# Patient Record
Sex: FEMALE | Race: BLACK OR AFRICAN AMERICAN | NOT HISPANIC OR LATINO | Employment: OTHER | URBAN - METROPOLITAN AREA
[De-identification: names, ages, dates, MRNs, and addresses within clinical notes are randomized per-mention and may not be internally consistent; named-entity substitution may affect disease eponyms.]

---

## 2017-01-10 DIAGNOSIS — M17.0 PRIMARY OSTEOARTHRITIS OF BOTH KNEES: ICD-10-CM

## 2017-01-10 RX ORDER — HYDROCODONE BITARTRATE AND ACETAMINOPHEN 10; 325 MG/1; MG/1
1 TABLET ORAL 3 TIMES DAILY PRN
Qty: 90 TABLET | Refills: 0 | Status: SHIPPED | OUTPATIENT
Start: 2017-01-12 | End: 2017-01-12 | Stop reason: SDUPTHER

## 2017-01-10 NOTE — TELEPHONE ENCOUNTER
----- Message from Roxane Hester sent at 1/10/2017 12:07 PM CST -----  Contact: Patient  Patient requesting refill of hydrocodone-acetaminophen 10-325mg (NORCO)  mg [facility] STORE [id number] [place] [address] [address] AT [address] & [facility] [address]

## 2017-01-11 DIAGNOSIS — M17.0 PRIMARY OSTEOARTHRITIS OF BOTH KNEES: ICD-10-CM

## 2017-01-11 NOTE — TELEPHONE ENCOUNTER
Rx refilled for 01/10/17. Done      Patient needs a refill on hydrocodone-acetaminophen 10-325mg (NORCO)  mg Tab     [facility] - [place] [address] [facility] & [address] [address]   Phone: [phone] Fax: [phone]

## 2017-01-12 ENCOUNTER — TELEPHONE (OUTPATIENT)
Dept: PHYSICAL MEDICINE AND REHAB | Facility: CLINIC | Age: 62
End: 2017-01-12

## 2017-01-12 DIAGNOSIS — M17.0 PRIMARY OSTEOARTHRITIS OF BOTH KNEES: ICD-10-CM

## 2017-01-12 RX ORDER — HYDROCODONE BITARTRATE AND ACETAMINOPHEN 10; 325 MG/1; MG/1
1 TABLET ORAL 3 TIMES DAILY PRN
Qty: 90 TABLET | Refills: 0 | Status: SHIPPED | OUTPATIENT
Start: 2017-01-12 | End: 2017-02-14 | Stop reason: SDUPTHER

## 2017-01-12 NOTE — TELEPHONE ENCOUNTER
Called and spoke with patient.  She was informed that I called [facility]  They were having trouble with there system so the Rx refill ddi not go threw.  Patient was ok with this.  New Rx for Hydrocodone will be sent.      Rx refill sent to Pharmacy 01/10/17    Pt is requesting to have Combes 10-325mg [facility] [phone] (Phone)   [phone] (Fax)     Pt contact number [phone]   Thanks

## 2017-02-02 ENCOUNTER — TELEPHONE (OUTPATIENT)
Dept: PHYSICAL MEDICINE AND REHAB | Facility: CLINIC | Age: 62
End: 2017-02-02

## 2017-02-02 NOTE — TELEPHONE ENCOUNTER
Called and spoke with patient.  [employer] has more questions concerning the form that was filled out for the patient.      ---- Message from Evon Fan sent at 2017  9:40 AM CST -----  Contact: PT  Would like someone to call her, regarding a form that has to be filled out for her housing. She would like someone to call her regarding this matter.     Please call: [phone]    Please call: Bina De La O ([facility])    Number to [phone]  Fax: [phone]

## 2017-02-03 NOTE — TELEPHONE ENCOUNTER
----- Message from Dex Joseph sent at 2/3/2017  9:18 AM CST -----  Contact: Self [phone]  Patient needs a medication refill     [facility] [employer] Store [address] - [place] [address] [facility] & [place] [address] [facility] [address]  Phone: [phone] Fax: [phone]

## 2017-02-14 DIAGNOSIS — M17.0 PRIMARY OSTEOARTHRITIS OF BOTH KNEES: ICD-10-CM

## 2017-02-14 RX ORDER — HYDROCODONE BITARTRATE AND ACETAMINOPHEN 10; 325 MG/1; MG/1
1 TABLET ORAL 3 TIMES DAILY PRN
Qty: 90 TABLET | Refills: 0 | Status: SHIPPED | OUTPATIENT
Start: 2017-02-14 | End: 2017-03-13 | Stop reason: SDUPTHER

## 2017-02-14 NOTE — TELEPHONE ENCOUNTER
10/31/16 last Office visit  01/12/17 last Rx refill  02/20/17 RTC      Pt is req a refill for hydrocodone 10/325.         [facility] - [place] [address] [facility] & [place] [address] [facility] [address]   Phone: [phone] Fax: [phone]

## 2017-03-13 DIAGNOSIS — M17.0 PRIMARY OSTEOARTHRITIS OF BOTH KNEES: ICD-10-CM

## 2017-03-13 RX ORDER — HYDROCODONE BITARTRATE AND ACETAMINOPHEN 10; 325 MG/1; MG/1
1 TABLET ORAL 3 TIMES DAILY PRN
Qty: 90 TABLET | Refills: 0 | Status: SHIPPED | OUTPATIENT
Start: 2017-03-13 | End: 2017-04-13 | Stop reason: SDUPTHER

## 2017-03-13 NOTE — TELEPHONE ENCOUNTER
10/31/16 last Office visit  02/14/17 last Rx refill  03/27/17 RTC    Patient is requesting a refill on hydrocodone-acetaminophen 10-325mg (NORCO)  mg Tab.       [facility] Store [address] - [place] [address] [address] [facility]   [address] [facility] [address]   Phone: [phone] Fax: [phone]

## 2017-03-27 ENCOUNTER — OFFICE VISIT (OUTPATIENT)
Dept: PHYSICAL MEDICINE AND REHAB | Facility: CLINIC | Age: 62
End: 2017-03-27
Payer: MEDICARE

## 2017-03-27 VITALS
HEIGHT: 67 IN | HEART RATE: 88 BPM | BODY MASS INDEX: 23.18 KG/M2 | WEIGHT: 147.69 LBS | SYSTOLIC BLOOD PRESSURE: 138 MMHG | DIASTOLIC BLOOD PRESSURE: 93 MMHG

## 2017-03-27 DIAGNOSIS — R26.9 GAIT DISORDER: ICD-10-CM

## 2017-03-27 DIAGNOSIS — F41.9 ANXIETY: ICD-10-CM

## 2017-03-27 DIAGNOSIS — M17.0 PRIMARY OSTEOARTHRITIS OF BOTH KNEES: Primary | ICD-10-CM

## 2017-03-27 DIAGNOSIS — M79.672 BILATERAL FOOT PAIN: ICD-10-CM

## 2017-03-27 DIAGNOSIS — E11.42 DIABETIC POLYNEUROPATHY ASSOCIATED WITH TYPE 2 DIABETES MELLITUS: ICD-10-CM

## 2017-03-27 DIAGNOSIS — M79.671 BILATERAL FOOT PAIN: ICD-10-CM

## 2017-03-27 PROCEDURE — 4010F ACE/ARB THERAPY RXD/TAKEN: CPT | Mod: S$GLB,,, | Performed by: PHYSICAL MEDICINE & REHABILITATION

## 2017-03-27 PROCEDURE — 99213 OFFICE O/P EST LOW 20 MIN: CPT | Mod: S$GLB,,, | Performed by: PHYSICAL MEDICINE & REHABILITATION

## 2017-03-27 PROCEDURE — 3080F DIAST BP >= 90 MM HG: CPT | Mod: S$GLB,,, | Performed by: PHYSICAL MEDICINE & REHABILITATION

## 2017-03-27 PROCEDURE — 99999 PR PBB SHADOW E&M-EST. PATIENT-LVL III: CPT | Mod: PBBFAC,,, | Performed by: PHYSICAL MEDICINE & REHABILITATION

## 2017-03-27 PROCEDURE — 2022F DILAT RTA XM EVC RTNOPTHY: CPT | Mod: S$GLB,,, | Performed by: PHYSICAL MEDICINE & REHABILITATION

## 2017-03-27 PROCEDURE — 1160F RVW MEDS BY RX/DR IN RCRD: CPT | Mod: S$GLB,,, | Performed by: PHYSICAL MEDICINE & REHABILITATION

## 2017-03-27 PROCEDURE — 3075F SYST BP GE 130 - 139MM HG: CPT | Mod: S$GLB,,, | Performed by: PHYSICAL MEDICINE & REHABILITATION

## 2017-03-27 PROCEDURE — 3046F HEMOGLOBIN A1C LEVEL >9.0%: CPT | Mod: S$GLB,,, | Performed by: PHYSICAL MEDICINE & REHABILITATION

## 2017-03-27 RX ORDER — PREGABALIN 100 MG/1
100 CAPSULE ORAL 3 TIMES DAILY
Qty: 90 CAPSULE | Refills: 2 | Status: SHIPPED | OUTPATIENT
Start: 2017-03-27 | End: 2018-05-04

## 2017-03-27 RX ORDER — CELECOXIB 200 MG/1
200 CAPSULE ORAL DAILY
Qty: 60 CAPSULE | Refills: 3 | Status: SHIPPED | OUTPATIENT
Start: 2017-03-27 | End: 2018-05-04

## 2017-03-27 RX ORDER — VENLAFAXINE 75 MG/1
75 TABLET ORAL 3 TIMES DAILY
Qty: 90 TABLET | Refills: 2 | Status: SHIPPED | OUTPATIENT
Start: 2017-03-27 | End: 2018-05-04

## 2017-03-27 RX ORDER — ALPRAZOLAM 0.5 MG/1
0.5 TABLET ORAL 2 TIMES DAILY PRN
Qty: 60 TABLET | Refills: 0 | Status: SHIPPED | OUTPATIENT
Start: 2017-03-27 | End: 2017-04-26 | Stop reason: SDUPTHER

## 2017-03-27 NOTE — PROGRESS NOTES
Subjective:       Patient ID: Urszula Yeh is a 61 y.o. female.    Chief Complaint: No chief complaint on file.    HPI     HISTORY OF PRESENT ILLNESS:  Mrs. Yeh is a 61-year-old black female with HTN,   DM, peripheral neuropathy, gouty arthritis, OA and multiple lower extremity   procedures secondary to fractures.  She is followed up in the Physical Medicine   Clinic for painful peripheral neuropathy and for OA of the knees.  Her last   visit to the clinic was on 10/31/2016.  She was maintained on Celebrex,   venlafaxine, Lyrica, p.r.n. hydrocodone/APAP and p.r.n. trazodone.    The patient is coming today to the clinic for followup.  Her knee pain has been   worse.  It is a constant aching pain aggravated by weightbearing and better with   sitting down or lying down.  Her maximum pain is 9/10 and minimum 7-8/10.    Today, it is 7-8/10.  The patient denies any significant swelling of her knees.    Her bilateral foot numbness has also been worse.  It is a constant dysesthetic   sensation.  It is usually not related to activity.  Her maximum pain is 9-10/10   and minimum 7-8/10.  Today, it is 7-8/10.    She is currently taking Celebrex 200 mg p.o. twice per day, Lyrica 100 mg p.o.   three times per day, venlafaxine 50 mg p.o. three times per day and   hydrocodone/APAP 10/325 p.r.n., usually three times per day.      MS/HN  dd: 03/27/2017 20:14:31 (CDT)  td: 03/28/2017 15:48:46 (CDT)  Doc ID   #[id number]  Job ID #[id number]    CC:         Review of Systems   Constitutional: Positive for fatigue.   Eyes: Negative for visual disturbance.   Respiratory: Negative for shortness of breath.    Cardiovascular: Negative for chest pain.   Gastrointestinal: Negative for blood in stool, constipation, nausea and vomiting.   Musculoskeletal: Positive for arthralgias (knees), back pain, gait problem and neck pain. Negative for joint swelling.   Neurological: Positive for dizziness. Negative for headaches.   Psychiatric/Behavioral:  Positive for sleep disturbance. Negative for behavioral problems.       Objective:      Physical Exam   Constitutional: She appears well-developed and well-nourished. No distress.   Coming to the clinic in a scooter     HENT:   Head: Normocephalic and atraumatic.   Neck: Normal range of motion.   Musculoskeletal:   BLE:  ROM:full.  Healed Rt knee surgical scar.  +ve Lt knee crepitus.  Strength:      RLE: 4+/5 at hip, knee, ankle DF/PF.     LLE:  4+/5 at hip, knee, ankle DF/PF.  Sensation to pinprick:     RLE: mild decrease in foot.      LLE: mild decrease in foot.  SLR:      RLE: -ve.      LLE: -ve.        Skin: Skin is warm.   Psychiatric: She has a normal mood and affect.       Assessment:       1. Primary osteoarthritis of both knees    2. Diabetic polyneuropathy associated with type 2 diabetes mellitus    3. Bilateral foot pain    4. Gait disorder       Plan:      - Continue celecoxib (CELEBREX) 200 MG capsule; Take 1 capsule (200 mg total) by mouth once daily. If no relief, may increase dose to twice per day.  - Increase venlafaxine (EFFEXOR) 75 MG Tab; Take 1 tablet (75 mg total) by mouth 3 (three) times daily.  - Continue pregabalin (LYRICA) 100 MG capsule; Take 1 capsule (100 mg total) by mouth 3 (three) times daily.  - Continue hydrocodone-acetaminophen 7.5-325mg (NORCO) 7.5-325 mg per tablet; Take 1 tablet by mouth 3 (three) times daily as needed for Pain.    - Regular home exercise program was encouraged.  - Return in about 3 months (around 6/27/2017).

## 2017-04-13 DIAGNOSIS — M17.0 PRIMARY OSTEOARTHRITIS OF BOTH KNEES: ICD-10-CM

## 2017-04-13 RX ORDER — HYDROCODONE BITARTRATE AND ACETAMINOPHEN 10; 325 MG/1; MG/1
1 TABLET ORAL 3 TIMES DAILY PRN
Qty: 90 TABLET | Refills: 0 | Status: SHIPPED | OUTPATIENT
Start: 2017-04-13 | End: 2017-05-12 | Stop reason: SDUPTHER

## 2017-04-13 NOTE — TELEPHONE ENCOUNTER
----- Message from Dex Joseph sent at 4/13/2017  2:02 PM CDT -----  Contact: Self [phone]  Patient is calling to get an update on medication refill ( hydrocodone-acetaminophen 10-325mg (NORCO)  mg Tab )    [facility] Store [address] [place] [address] K[employer] Learning[employer] [facility] & [place] [address] [address] [facility] [address]  Phone: [phone] Fax: [phone]

## 2017-04-13 NOTE — TELEPHONE ENCOUNTER
----- Message from Mariam Long sent at 4/13/2017  8:04 AM CDT -----  Contact: Patient [phone]  Patient requesting refill on her hydrocodone-acetaminophen 10-325mg (NORCO)  mg Tab.      [facility] Store [address] [place] [address] [address] [facility] & [place] [address] [address] [facility] [address]  Phone: [phone] Fax: [phone]

## 2017-04-26 DIAGNOSIS — F41.9 ANXIETY: ICD-10-CM

## 2017-04-26 RX ORDER — ALPRAZOLAM 0.5 MG/1
0.5 TABLET ORAL 2 TIMES DAILY PRN
Qty: 60 TABLET | Refills: 0 | Status: SHIPPED | OUTPATIENT
Start: 2017-04-26 | End: 2017-05-26 | Stop reason: SDUPTHER

## 2017-05-12 DIAGNOSIS — M17.0 PRIMARY OSTEOARTHRITIS OF BOTH KNEES: ICD-10-CM

## 2017-05-12 RX ORDER — HYDROCODONE BITARTRATE AND ACETAMINOPHEN 10; 325 MG/1; MG/1
1 TABLET ORAL 3 TIMES DAILY PRN
Qty: 90 TABLET | Refills: 0 | Status: SHIPPED | OUTPATIENT
Start: 2017-05-13 | End: 2017-06-12 | Stop reason: SDUPTHER

## 2017-05-26 DIAGNOSIS — F41.9 ANXIETY: ICD-10-CM

## 2017-05-26 RX ORDER — ALPRAZOLAM 0.5 MG/1
0.5 TABLET ORAL 2 TIMES DAILY PRN
Qty: 60 TABLET | Refills: 0 | Status: SHIPPED | OUTPATIENT
Start: 2017-05-26 | End: 2017-07-11 | Stop reason: SDUPTHER

## 2017-05-26 NOTE — TELEPHONE ENCOUNTER
----- Message from Shaji Hampton sent at 5/26/2017  8:12 AM CDT -----  Contact: Pt   Pt would like the nurse to give her a call back in regards to her medication Rx on her alprazolam (XANAX) 0.5 MG tablet.. Contact number [phone]..

## 2017-06-12 DIAGNOSIS — M17.0 PRIMARY OSTEOARTHRITIS OF BOTH KNEES: ICD-10-CM

## 2017-06-12 RX ORDER — HYDROCODONE BITARTRATE AND ACETAMINOPHEN 10; 325 MG/1; MG/1
1 TABLET ORAL 3 TIMES DAILY PRN
Qty: 90 TABLET | Refills: 0 | Status: SHIPPED | OUTPATIENT
Start: 2017-06-12 | End: 2017-07-11 | Stop reason: SDUPTHER

## 2017-06-12 NOTE — TELEPHONE ENCOUNTER
----- Message from Mariam Long sent at 6/12/2017  9:27 AM CDT -----  Contact: Patient [phone]  Patient is requesting a refill on her hydrocodone-acetaminophen 10-325mg (NORCO)  mg Tab.      [facility] Store [address] [place] [address] Nestio[address] [facility] & [place] [address] [facility] [facility] [address]  Phone: [phone] Fax: [phone]

## 2017-07-11 DIAGNOSIS — M17.0 PRIMARY OSTEOARTHRITIS OF BOTH KNEES: ICD-10-CM

## 2017-07-11 DIAGNOSIS — F41.9 ANXIETY: ICD-10-CM

## 2017-07-11 RX ORDER — HYDROCODONE BITARTRATE AND ACETAMINOPHEN 10; 325 MG/1; MG/1
1 TABLET ORAL 3 TIMES DAILY PRN
Qty: 90 TABLET | Refills: 0 | Status: SHIPPED | OUTPATIENT
Start: 2017-07-11 | End: 2017-08-11 | Stop reason: SDUPTHER

## 2017-07-11 RX ORDER — ALPRAZOLAM 0.5 MG/1
0.5 TABLET ORAL 2 TIMES DAILY PRN
Qty: 60 TABLET | Refills: 0 | Status: SHIPPED | OUTPATIENT
Start: 2017-07-11 | End: 2017-08-11 | Stop reason: SDUPTHER

## 2017-07-11 NOTE — TELEPHONE ENCOUNTER
03/27/17 last Office visit  05/26/17 last RX refill-Xanax  06/12/17 last Rx refill-Hydrocodone          Patient is calling to get an update on the medication request ( alprazolam (XANAX) 0.5 MG tablet ) (hydrocodone-acetaminophen 10-325mg (NORCO)  mg Tab ) the weather is getting bad and she would like to  the medication now.     [facility] [address] [place] [address] [facility] & [place] [address] [facility] [address]   Phone: [phone] Fax: [phone]

## 2017-08-11 DIAGNOSIS — M17.0 PRIMARY OSTEOARTHRITIS OF BOTH KNEES: ICD-10-CM

## 2017-08-11 DIAGNOSIS — F41.9 ANXIETY: ICD-10-CM

## 2017-08-11 RX ORDER — HYDROCODONE BITARTRATE AND ACETAMINOPHEN 10; 325 MG/1; MG/1
1 TABLET ORAL 3 TIMES DAILY PRN
Qty: 90 TABLET | Refills: 0 | Status: SHIPPED | OUTPATIENT
Start: 2017-08-11 | End: 2017-09-11 | Stop reason: SDUPTHER

## 2017-08-11 RX ORDER — ALPRAZOLAM 0.5 MG/1
0.5 TABLET ORAL 2 TIMES DAILY PRN
Qty: 60 TABLET | Refills: 0 | Status: SHIPPED | OUTPATIENT
Start: 2017-08-11 | End: 2017-09-11 | Stop reason: SDUPTHER

## 2017-09-11 DIAGNOSIS — F41.9 ANXIETY: ICD-10-CM

## 2017-09-11 DIAGNOSIS — M17.0 PRIMARY OSTEOARTHRITIS OF BOTH KNEES: ICD-10-CM

## 2017-09-11 RX ORDER — ALPRAZOLAM 0.5 MG/1
0.5 TABLET ORAL 2 TIMES DAILY PRN
Qty: 60 TABLET | Refills: 0 | Status: SHIPPED | OUTPATIENT
Start: 2017-09-11 | End: 2017-10-10 | Stop reason: SDUPTHER

## 2017-09-11 RX ORDER — HYDROCODONE BITARTRATE AND ACETAMINOPHEN 10; 325 MG/1; MG/1
1 TABLET ORAL 3 TIMES DAILY PRN
Qty: 90 TABLET | Refills: 0 | Status: SHIPPED | OUTPATIENT
Start: 2017-09-11 | End: 2017-10-10 | Stop reason: SDUPTHER

## 2017-09-25 DIAGNOSIS — B18.2 HEPATITIS C, CHRONIC: Primary | ICD-10-CM

## 2017-10-10 DIAGNOSIS — M17.0 PRIMARY OSTEOARTHRITIS OF BOTH KNEES: ICD-10-CM

## 2017-10-10 DIAGNOSIS — F41.9 ANXIETY: ICD-10-CM

## 2017-10-10 RX ORDER — ALPRAZOLAM 0.5 MG/1
0.5 TABLET ORAL 2 TIMES DAILY PRN
Qty: 60 TABLET | Refills: 0 | Status: SHIPPED | OUTPATIENT
Start: 2017-10-11 | End: 2017-11-10 | Stop reason: SDUPTHER

## 2017-10-10 RX ORDER — HYDROCODONE BITARTRATE AND ACETAMINOPHEN 10; 325 MG/1; MG/1
1 TABLET ORAL 3 TIMES DAILY PRN
Qty: 90 TABLET | Refills: 0 | Status: SHIPPED | OUTPATIENT
Start: 2017-10-11 | End: 2017-11-10 | Stop reason: SDUPTHER

## 2017-11-10 DIAGNOSIS — F41.9 ANXIETY: ICD-10-CM

## 2017-11-10 DIAGNOSIS — M17.0 PRIMARY OSTEOARTHRITIS OF BOTH KNEES: ICD-10-CM

## 2017-11-10 RX ORDER — HYDROCODONE BITARTRATE AND ACETAMINOPHEN 10; 325 MG/1; MG/1
1 TABLET ORAL 3 TIMES DAILY PRN
Qty: 90 TABLET | Refills: 0 | Status: SHIPPED | OUTPATIENT
Start: 2017-11-10 | End: 2017-12-07 | Stop reason: SDUPTHER

## 2017-11-10 RX ORDER — ALPRAZOLAM 0.5 MG/1
0.5 TABLET ORAL 2 TIMES DAILY PRN
Qty: 60 TABLET | Refills: 0 | Status: SHIPPED | OUTPATIENT
Start: 2017-11-10 | End: 2017-12-07 | Stop reason: SDUPTHER

## 2017-12-07 DIAGNOSIS — F41.9 ANXIETY: ICD-10-CM

## 2017-12-07 DIAGNOSIS — M17.0 PRIMARY OSTEOARTHRITIS OF BOTH KNEES: ICD-10-CM

## 2017-12-07 DIAGNOSIS — M25.561 ACUTE PAIN OF RIGHT KNEE: Primary | ICD-10-CM

## 2017-12-07 RX ORDER — ALPRAZOLAM 0.5 MG/1
0.5 TABLET ORAL 2 TIMES DAILY PRN
Qty: 60 TABLET | Refills: 0 | Status: SHIPPED | OUTPATIENT
Start: 2017-12-10 | End: 2018-05-04

## 2017-12-07 RX ORDER — HYDROCODONE BITARTRATE AND ACETAMINOPHEN 10; 325 MG/1; MG/1
1 TABLET ORAL 3 TIMES DAILY PRN
Qty: 90 TABLET | Refills: 0 | Status: SHIPPED | OUTPATIENT
Start: 2017-12-10 | End: 2018-01-11 | Stop reason: SDUPTHER

## 2018-01-03 DIAGNOSIS — M25.562 KNEE PAIN, BILATERAL: Primary | ICD-10-CM

## 2018-01-03 DIAGNOSIS — M25.561 KNEE PAIN, BILATERAL: Primary | ICD-10-CM

## 2018-01-11 DIAGNOSIS — M17.0 PRIMARY OSTEOARTHRITIS OF BOTH KNEES: ICD-10-CM

## 2018-01-11 RX ORDER — HYDROCODONE BITARTRATE AND ACETAMINOPHEN 10; 325 MG/1; MG/1
1 TABLET ORAL 3 TIMES DAILY PRN
Qty: 90 TABLET | Refills: 0 | Status: SHIPPED | OUTPATIENT
Start: 2018-01-11 | End: 2018-02-12 | Stop reason: SDUPTHER

## 2018-02-12 DIAGNOSIS — M17.0 PRIMARY OSTEOARTHRITIS OF BOTH KNEES: ICD-10-CM

## 2018-02-12 RX ORDER — HYDROCODONE BITARTRATE AND ACETAMINOPHEN 10; 325 MG/1; MG/1
1 TABLET ORAL 3 TIMES DAILY PRN
Qty: 90 TABLET | Refills: 0 | Status: SHIPPED | OUTPATIENT
Start: 2018-02-12 | End: 2018-03-14 | Stop reason: SDUPTHER

## 2018-02-14 NOTE — TELEPHONE ENCOUNTER
Pt says her pharmacy told her Dr Gallo forgot to put her diagnosis on her prescription for hydrocodone 10/325.        Called and spoke with [facility] Dx code cintia

## 2018-02-14 NOTE — TELEPHONE ENCOUNTER
----- Message from Urszula Ruelas sent at 2/14/2018  8:09 AM CST -----  Contact: self @ [phone]  Pt says her pharmacy told her Dr Gallo forgot to put her diagnosis on her prescription for hydrocodone 10/325.      [facility] [employer] [address] [place] [address] [address] AT [address] [phone] (Phone) or [phone] (Fax)

## 2018-03-12 DIAGNOSIS — M17.0 PRIMARY OSTEOARTHRITIS OF BOTH KNEES: ICD-10-CM

## 2018-03-12 RX ORDER — HYDROCODONE BITARTRATE AND ACETAMINOPHEN 10; 325 MG/1; MG/1
1 TABLET ORAL 3 TIMES DAILY PRN
Qty: 90 TABLET | Refills: 0 | OUTPATIENT
Start: 2018-03-12 | End: 2018-04-11

## 2018-03-14 DIAGNOSIS — M17.0 PRIMARY OSTEOARTHRITIS OF BOTH KNEES: ICD-10-CM

## 2018-03-14 RX ORDER — HYDROCODONE BITARTRATE AND ACETAMINOPHEN 10; 325 MG/1; MG/1
1 TABLET ORAL 3 TIMES DAILY PRN
Qty: 90 TABLET | Refills: 0 | Status: SHIPPED | OUTPATIENT
Start: 2018-03-14 | End: 2018-04-11 | Stop reason: SDUPTHER

## 2018-04-11 DIAGNOSIS — M17.0 PRIMARY OSTEOARTHRITIS OF BOTH KNEES: ICD-10-CM

## 2018-04-11 RX ORDER — HYDROCODONE BITARTRATE AND ACETAMINOPHEN 10; 325 MG/1; MG/1
1 TABLET ORAL 3 TIMES DAILY PRN
Qty: 90 TABLET | Refills: 0 | Status: SHIPPED | OUTPATIENT
Start: 2018-04-13 | End: 2018-05-04

## 2018-04-11 NOTE — TELEPHONE ENCOUNTER
03/14/18 last refill   03/27/18 last office visit            ---- Message from Devonte Lakhani sent at 4/11/2018 11:44 AM CDT -----  Contact: Self @ [phone]  Pt is asking for refill on hydrocodone-acetaminophen 10-325mg (NORCO)  mg [facility] Store [address] [place] [address] [employer] [facility] [place] & [place] [address] [facility] [facility] [address]  Phone: [phone] Fax: [phone]

## 2018-05-04 ENCOUNTER — TELEPHONE (OUTPATIENT)
Dept: ENDOSCOPY | Facility: HOSPITAL | Age: 63
End: 2018-05-04

## 2018-05-04 DIAGNOSIS — Z12.11 SPECIAL SCREENING FOR MALIGNANT NEOPLASMS, COLON: Primary | ICD-10-CM

## 2018-05-04 RX ORDER — AMLODIPINE BESYLATE 5 MG/1
5 TABLET ORAL DAILY
COMMUNITY

## 2018-05-04 RX ORDER — GABAPENTIN 600 MG/1
600 TABLET ORAL 3 TIMES DAILY
COMMUNITY

## 2018-05-04 RX ORDER — LISINOPRIL 40 MG/1
40 TABLET ORAL DAILY
COMMUNITY

## 2018-05-04 RX ORDER — MELOXICAM 15 MG/1
15 TABLET ORAL DAILY
COMMUNITY

## 2018-05-04 RX ORDER — HYDROCORTISONE 25 MG/G
CREAM TOPICAL DAILY
COMMUNITY

## 2018-05-04 RX ORDER — TRIAMCINOLONE ACETONIDE 5 MG/G
CREAM TOPICAL 2 TIMES DAILY
COMMUNITY
End: 2018-08-16

## 2018-05-04 RX ORDER — VARENICLINE TARTRATE 0.5 (11)-1
1 KIT ORAL 2 TIMES DAILY
COMMUNITY

## 2018-05-04 RX ORDER — INSULIN ASPART 100 [IU]/ML
5 INJECTION, SOLUTION INTRAVENOUS; SUBCUTANEOUS
COMMUNITY

## 2018-05-04 RX ORDER — HYDROXYZINE HYDROCHLORIDE 25 MG/1
25 TABLET, FILM COATED ORAL DAILY
COMMUNITY

## 2018-05-04 RX ORDER — LEDIPASVIR AND SOFOSBUVIR 90; 400 MG/1; MG/1
1 TABLET, FILM COATED ORAL DAILY
COMMUNITY

## 2018-05-11 DIAGNOSIS — M17.0 PRIMARY OSTEOARTHRITIS OF BOTH KNEES: ICD-10-CM

## 2018-05-11 RX ORDER — HYDROCODONE BITARTRATE AND ACETAMINOPHEN 10; 325 MG/1; MG/1
1 TABLET ORAL 3 TIMES DAILY PRN
Qty: 90 TABLET | Refills: 0 | Status: SHIPPED | OUTPATIENT
Start: 2018-05-12 | End: 2018-06-12 | Stop reason: SDUPTHER

## 2018-05-11 NOTE — TELEPHONE ENCOUNTER
----- Message from Devonte Lakhani sent at 5/11/2018  8:08 AM CDT -----  Contact: Self @ [phone]  Pt is asking for refill on hydrocodone-acetaminophen 10 MG.    [facility] Store [address] - [place] [address] [address] AT [place] & [place] [address] foodpanda / hellofoodAZINE [facility] [address]  Phone: [phone] Fax: [phone]

## 2018-06-12 DIAGNOSIS — E11.42 DIABETIC POLYNEUROPATHY ASSOCIATED WITH TYPE 2 DIABETES MELLITUS: ICD-10-CM

## 2018-06-12 DIAGNOSIS — M17.0 PRIMARY OSTEOARTHRITIS OF BOTH KNEES: ICD-10-CM

## 2018-06-12 RX ORDER — HYDROCODONE BITARTRATE AND ACETAMINOPHEN 10; 325 MG/1; MG/1
1 TABLET ORAL 3 TIMES DAILY PRN
Qty: 90 TABLET | Refills: 0 | Status: SHIPPED | OUTPATIENT
Start: 2018-06-12 | End: 2018-07-13 | Stop reason: SDUPTHER

## 2018-06-12 RX ORDER — VENLAFAXINE 75 MG/1
75 TABLET ORAL 3 TIMES DAILY
Qty: 90 TABLET | Refills: 2 | Status: SHIPPED | OUTPATIENT
Start: 2018-06-12 | End: 2018-11-07 | Stop reason: SDUPTHER

## 2018-06-12 RX ORDER — PREGABALIN 100 MG/1
100 CAPSULE ORAL 3 TIMES DAILY
Qty: 90 CAPSULE | Refills: 2 | Status: SHIPPED | OUTPATIENT
Start: 2018-06-12 | End: 2018-07-12

## 2018-06-12 NOTE — TELEPHONE ENCOUNTER
05/11/18 last Rx refill hydrocodone  03/27/17 last office visit  03/27/17 last Rx refill Lyrica, Venlafxine          ----- Message from Mark Alejo sent at 6/12/2018  8:38 AM CDT -----  Contact: Pt. [phone]  Rx Refill/Request     Refill Rx:      Rx Name and Strength:  Hydrocodone  MG, Lyrica 100 MG, and Venlafaxine 75 MG     Preferred Pharmacy with phone number:   [facility] [address] [place] [address] [address] [facility] & [place] [address] [address] [facility] [address]  Phone: [phone] Fax: [phone]      Communication Preference: PHONE

## 2018-07-13 ENCOUNTER — TELEPHONE (OUTPATIENT)
Dept: PHYSICAL MEDICINE AND REHAB | Facility: CLINIC | Age: 63
End: 2018-07-13

## 2018-07-13 DIAGNOSIS — M17.0 PRIMARY OSTEOARTHRITIS OF BOTH KNEES: ICD-10-CM

## 2018-07-13 RX ORDER — HYDROCODONE BITARTRATE AND ACETAMINOPHEN 10; 325 MG/1; MG/1
1 TABLET ORAL 3 TIMES DAILY PRN
Qty: 90 TABLET | Refills: 0 | Status: SHIPPED | OUTPATIENT
Start: 2018-07-13 | End: 2018-08-13 | Stop reason: SDUPTHER

## 2018-07-13 NOTE — TELEPHONE ENCOUNTER
----- Message from Devonte Lakhani sent at 7/13/2018 11:49 AM CDT -----  Rx Refill/Request     Is this a Refill or New Rx: Refill   Rx Name and Strength:  HYDROcodone-acetaminophen (NORCO)  mg per tablet  Preferred Pharmacy with phone number: See below  Communication Preference: [phone]  Additional Information: Pt is asking this be completed today. Pt is also asking for a call back once refill has been sent to the pharmacy.    [facility] [address] [place] [address] [employer]acosm[employer]INE [facility] & [place] [address] Paracosm[address] [facility] [address]  Phone: [phone] Fax: [phone]

## 2018-08-13 DIAGNOSIS — M17.0 PRIMARY OSTEOARTHRITIS OF BOTH KNEES: ICD-10-CM

## 2018-08-13 RX ORDER — HYDROCODONE BITARTRATE AND ACETAMINOPHEN 10; 325 MG/1; MG/1
1 TABLET ORAL 3 TIMES DAILY PRN
Qty: 90 TABLET | Refills: 0 | Status: SHIPPED | OUTPATIENT
Start: 2018-08-13 | End: 2018-09-13 | Stop reason: SDUPTHER

## 2018-08-13 NOTE — TELEPHONE ENCOUNTER
03/27/18 last office visit  07/13/18 last Rx refill   08/16/18 RTC            ----- Message from Dex Joseph sent at 8/13/2018  8:04 AM CDT -----  Contact: Patient @ [phone]  Rx Refill/Request     Is this a Refill or New Rx:  Yes   Rx Name and Strength:  HYDROcodone-acetaminophen (NORCO)  mg per tablet    Preferred Pharmacy with phone number:     [facility] [address] [place] [address] [address] [place] [address] [facility] [address]  Phone: [phone] Fax: [phone]

## 2018-08-16 ENCOUNTER — OFFICE VISIT (OUTPATIENT)
Dept: DERMATOLOGY | Facility: CLINIC | Age: 63
End: 2018-08-16
Payer: MEDICARE

## 2018-08-16 DIAGNOSIS — L28.0 LICHEN SIMPLEX CHRONICUS: Primary | ICD-10-CM

## 2018-08-16 DIAGNOSIS — L29.9 PRURITUS: ICD-10-CM

## 2018-08-16 PROCEDURE — 99203 OFFICE O/P NEW LOW 30 MIN: CPT | Mod: 25,S$GLB,, | Performed by: NURSE PRACTITIONER

## 2018-08-16 PROCEDURE — 99999 PR PBB SHADOW E&M-EST. PATIENT-LVL III: CPT | Mod: PBBFAC,,, | Performed by: NURSE PRACTITIONER

## 2018-08-16 PROCEDURE — 11900 INJECT SKIN LESIONS </W 7: CPT | Mod: S$GLB,,, | Performed by: NURSE PRACTITIONER

## 2018-08-16 RX ORDER — TRIAMCINOLONE ACETONIDE 1 MG/G
CREAM TOPICAL
Qty: 454 G | Refills: 3 | Status: SHIPPED | OUTPATIENT
Start: 2018-08-16

## 2018-08-16 NOTE — LETTER
August 16, 2018      George Cervantes MD  [address] [facility] [address]           [facility] - Dermatology  [address]  [place] [address]  Phone: [phone]  Fax: [phone]          Patient: Urszula Yeh   MR Number: [id number]   YOB: 1955   Date of Visit: 8/16/2018       Dear Dr. George Cervantes:    Thank you for referring Urszula Yeh to me for evaluation. Attached you will find relevant portions of my assessment and plan of care.    If you have questions, please do not hesitate to call me. I look forward to following Urszula Yeh along with you.    Sincerely,    Dania Nagy, NP    Enclosure  CC:  No Recipients    If you would like to receive this communication electronically, please contact [email or web address] or [phone] to request more information on DOZ Link access.    For providers and/or their staff who would like to refer a patient to [facility], please contact us through our one-stop-shop provider referral line, [facility] , at [phone].    If you feel you have received this communication in error or would no longer like to receive these types of communications, please e-mail [email or web address]

## 2018-08-16 NOTE — PROGRESS NOTES
Subjective:       Patient ID:  Urszula Yeh is a 62 y.o. female who presents for   Chief Complaint   Patient presents with    Itching     dry itchy spots buttocks x 2 months, itchy otc: Dermasil     Itching  - Initial  Affected locations: back, left buttock and right buttock  Duration: 2 months  Signs / symptoms: itching  Severity: mild  Timing: constant  Aggravated by: nothing  Treatments tried: Dermasil, Atarx TID x1 week, Dove soap.         Review of Systems   Constitutional:        Smokes cigarettes 1/2 ppd   Skin: Positive for itching. Negative for daily sunscreen use and activity-related sunscreen use.   Hematologic/Lymphatic: Bruises/bleeds easily.        Objective:    Physical Exam   Constitutional: She appears well-developed and well-nourished. No distress.   Neurological: She is alert and oriented to person, place, and time. She is not disoriented.   Psychiatric: She has a normal mood and affect.   Skin:   Areas Examined (abnormalities noted in diagram):   Head / Face Inspection Performed  Neck Inspection Performed  Chest / Axilla Inspection Performed  Abdomen Inspection Performed  Genitals / Buttocks / Groin Inspection Performed  Back Inspection Performed  RUE Inspected  LUE Inspection Performed  RLE Inspected  LLE Inspection Performed  Nails and Digits Inspection Performed              Diagram Legend     Erythematous scaling macule/papule c/w actinic keratosis       Vascular papule c/w angioma      Pigmented verrucoid papule/plaque c/w seborrheic keratosis      Yellow umbilicated papule c/w sebaceous hyperplasia      Irregularly shaped tan macule c/w lentigo     1-2 mm smooth white papules consistent with Milia      Movable subcutaneous cyst with punctum c/w epidermal inclusion cyst      Subcutaneous movable cyst c/w pilar cyst      Firm pink to brown papule c/w dermatofibroma      Pedunculated fleshy papule(s) c/w skin tag(s)      Evenly pigmented macule c/w junctional nevus     Mildly variegated  pigmented, slightly irregular-bordered macule c/w mildly atypical nevus      Flesh colored to evenly pigmented papule c/w intradermal nevus       Pink pearly papule/plaque c/w basal cell carcinoma      Erythematous hyperkeratotic cursted plaque c/w SCC      Surgical scar with no sign of skin cancer recurrence      Open and closed comedones      Inflammatory papules and pustules      Verrucoid papule consistent consistent with wart     Erythematous eczematous patches and plaques     Dystrophic onycholytic nail with subungual debris c/w onychomycosis     Umbilicated papule    Erythematous-base heme-crusted tan verrucoid plaque consistent with inflamed seborrheic keratosis     Erythematous Silvery Scaling Plaque c/w Psoriasis     See annotation      Assessment / Plan:        Lichen simplex chronicus  Intralesional Kenalog 20mg/cc (0.8 cc total) injected into 4 lesions on the buttocks today after obtaining verbal consent including risk of surrounding hypopigmentation. Patient tolerated procedure well.    Units:1  NDC for Kenalog 40mg/cc:  8717-0136-96        Pruritus  -     triamcinolone acetonide 0.1% (KENALOG) 0.1 % cream; AAA bid  Dispense: 454 g; Refill: 3    Good skin care regimen discussed including limiting to one bath or shower/day, using lukewarm water with mild soap and moisturizing cream to skin 1 - 2x/day. Brochure was provided and reviewed with patient.  Discussed with patient the importance of not manipulating skin lesions. Trauma often exacerbates condition. Trimming nails is recommended to avoid puncturing skin.     Cerave Cream    No clinical evidence of lesions today. No further treatment needed at this time. Patient instructed to return to clinic if lesions recur or new lesions appear.    Labs requests sent to [facility]    Zyrtec or Claritin every morning    Atarax 2 tabs nightly    cerave cream BID    TAC cream BID             Follow-up if symptoms worsen or fail to improve.

## 2018-09-13 DIAGNOSIS — M17.0 PRIMARY OSTEOARTHRITIS OF BOTH KNEES: ICD-10-CM

## 2018-09-13 RX ORDER — HYDROCODONE BITARTRATE AND ACETAMINOPHEN 10; 325 MG/1; MG/1
1 TABLET ORAL 3 TIMES DAILY PRN
Qty: 90 TABLET | Refills: 0 | Status: SHIPPED | OUTPATIENT
Start: 2018-09-14 | End: 2018-10-10 | Stop reason: SDUPTHER

## 2018-09-13 NOTE — TELEPHONE ENCOUNTER
----- Message from Daya Guzman sent at 9/13/2018  8:02 AM CDT -----  Contact: pt  Pt would like to be called back regarding Rx Hydrocodone     Pt can be reached at [phone]

## 2018-10-10 DIAGNOSIS — M17.0 PRIMARY OSTEOARTHRITIS OF BOTH KNEES: ICD-10-CM

## 2018-10-10 RX ORDER — HYDROCODONE BITARTRATE AND ACETAMINOPHEN 10; 325 MG/1; MG/1
1 TABLET ORAL 3 TIMES DAILY PRN
Qty: 90 TABLET | Refills: 0 | Status: SHIPPED | OUTPATIENT
Start: 2018-10-13 | End: 2018-11-08 | Stop reason: SDUPTHER

## 2018-10-10 NOTE — TELEPHONE ENCOUNTER
----- Message from Caitie Tinsley sent at 10/10/2018  8:05 AM CDT -----  Contact: Self  Pt is calling to speak with Staff regarding a medication refill of HYDROcodone-acetaminophen (NORCO)  mg per tablet.  Pt says she is out of the medication.    She can be reached at [phone].    Thank you.

## 2018-11-07 DIAGNOSIS — M17.0 PRIMARY OSTEOARTHRITIS OF BOTH KNEES: ICD-10-CM

## 2018-11-07 RX ORDER — VENLAFAXINE 75 MG/1
TABLET ORAL
Qty: 90 TABLET | Refills: 1 | Status: SHIPPED | OUTPATIENT
Start: 2018-11-07 | End: 2019-10-29 | Stop reason: SDUPTHER

## 2018-11-08 RX ORDER — HYDROCODONE BITARTRATE AND ACETAMINOPHEN 10; 325 MG/1; MG/1
1 TABLET ORAL 3 TIMES DAILY PRN
Qty: 90 TABLET | Refills: 0 | Status: SHIPPED | OUTPATIENT
Start: 2018-11-09 | End: 2018-12-10 | Stop reason: SDUPTHER

## 2018-11-08 NOTE — TELEPHONE ENCOUNTER
-10/10/18 last Rx refill  03/27/18 last office visit  01/31/19 RTC       ---- Message from Devonte Lakhani sent at 11/8/2018  8:03 AM CST -----  Rx Refill/Request     Is this a Refill or New Rx: refill  Rx Name and Strength: HYDROcodone-acetaminophen (NORCO)  mg per tablet  Preferred Pharmacy with phone number: see below  Communication Preference: [phone]  Additional Information:     [employer] [address] [place] [address] [employer]omaticsAZINE [facility] & [place] [address] [facility] [address]  Phone: [phone] Fax: [phone]

## 2018-12-10 ENCOUNTER — TELEPHONE (OUTPATIENT)
Dept: PHYSICAL MEDICINE AND REHAB | Facility: CLINIC | Age: 63
End: 2018-12-10

## 2018-12-10 DIAGNOSIS — M17.0 PRIMARY OSTEOARTHRITIS OF BOTH KNEES: ICD-10-CM

## 2018-12-10 RX ORDER — HYDROCODONE BITARTRATE AND ACETAMINOPHEN 10; 325 MG/1; MG/1
1 TABLET ORAL 3 TIMES DAILY PRN
Qty: 90 TABLET | Refills: 0 | Status: SHIPPED | OUTPATIENT
Start: 2018-12-10 | End: 2019-01-10 | Stop reason: SDUPTHER

## 2018-12-10 NOTE — TELEPHONE ENCOUNTER
11/08/18 last rx refill  03/27/18 last office visit  01/31/19 RTC        ----- Message from Jenna Reyes sent at 12/10/2018  9:07 AM CST -----  Contact: Pt  Rx Refill/Request     Is this a Refill or New Rx: Refill    Rx Name and Strength:   Norco 10-325mg  Preferred Pharmacy with phone number: [facility] # [phone]  Communication Preference: Pt states she doesn't have a phone at the moment, she will contact the pharmacy to check if its been filled   Additional Information: .

## 2019-01-07 ENCOUNTER — TELEPHONE (OUTPATIENT)
Dept: ENDOSCOPY | Facility: HOSPITAL | Age: 64
End: 2019-01-07

## 2019-01-10 DIAGNOSIS — M17.0 PRIMARY OSTEOARTHRITIS OF BOTH KNEES: ICD-10-CM

## 2019-01-10 RX ORDER — HYDROCODONE BITARTRATE AND ACETAMINOPHEN 10; 325 MG/1; MG/1
1 TABLET ORAL 3 TIMES DAILY PRN
Qty: 90 TABLET | Refills: 0 | Status: SHIPPED | OUTPATIENT
Start: 2019-01-11 | End: 2019-02-11 | Stop reason: SDUPTHER

## 2019-02-08 DIAGNOSIS — M17.0 PRIMARY OSTEOARTHRITIS OF BOTH KNEES: ICD-10-CM

## 2019-02-08 RX ORDER — HYDROCODONE BITARTRATE AND ACETAMINOPHEN 10; 325 MG/1; MG/1
1 TABLET ORAL 3 TIMES DAILY PRN
Qty: 90 TABLET | Refills: 0 | OUTPATIENT
Start: 2019-02-08 | End: 2019-03-10

## 2019-02-08 NOTE — TELEPHONE ENCOUNTER
----- Message from Tiffanie Briones sent at 2/8/2019  8:18 AM CST -----  Rx Refill/Request     Is this a Refill or New Rx:refill      Rx Name and Strength: HYDROcodone-acetaminophen (NORCO)  mg per tablet   Preferred Pharmacy with phone number:     [facility] [address] [place] [address] [address] [employer] [facility] & [place] [address] [employer] [facility] [address]  Phone: [phone] Fax: [phone]      Communication Preference:pt @ [phone]  Additional Information:

## 2019-02-11 ENCOUNTER — TELEPHONE (OUTPATIENT)
Dept: PHYSICAL MEDICINE AND REHAB | Facility: CLINIC | Age: 64
End: 2019-02-11

## 2019-02-11 DIAGNOSIS — M17.0 PRIMARY OSTEOARTHRITIS OF BOTH KNEES: ICD-10-CM

## 2019-02-11 RX ORDER — HYDROCODONE BITARTRATE AND ACETAMINOPHEN 10; 325 MG/1; MG/1
1 TABLET ORAL 3 TIMES DAILY PRN
Qty: 90 TABLET | Refills: 0 | Status: SHIPPED | OUTPATIENT
Start: 2019-02-11 | End: 2019-03-11 | Stop reason: SDUPTHER

## 2019-02-11 NOTE — TELEPHONE ENCOUNTER
----- Message from Urszula Ruelas sent at 2/11/2019 11:11 AM CST -----  Contact: self @ [phone]  Pt is req a refill for hydrocodone 10/325.    [facility] [address] [place] [address] [address] AT [employer] & [address]                       [phone] (Phone)      [phone] (Fax)

## 2019-02-11 NOTE — TELEPHONE ENCOUNTER
LM letting the patient know that she has not seen the Dr since 2017 and will have to make an appointment before any meicationis given.

## 2019-02-11 NOTE — TELEPHONE ENCOUNTER
Last Visit: 03/27/2017  Canceled/No Show Visit: 06/27/2017, 01/31/2019  Next Visit: 06/03/2019  Last Refill: 01/11/2019    ----- Message from Tiffanie Briones sent at 2/11/2019  8:03 AM CST -----  Rx Refill/Request     Is this a Refill or New Rx:  refill  Rx Name and Strength:  HYDROcodone-acetaminophen (NORCO)  mg per tablet    Preferred Pharmacy with phone number:   [facility] [address] [place] [address] [facility] & [place] [address] [facility] [address]  Phone: [phone] Fax: [phone]      Communication Preference:pt @ [phone]  Additional Information:

## 2019-03-11 DIAGNOSIS — M17.0 PRIMARY OSTEOARTHRITIS OF BOTH KNEES: ICD-10-CM

## 2019-03-11 RX ORDER — HYDROCODONE BITARTRATE AND ACETAMINOPHEN 10; 325 MG/1; MG/1
1 TABLET ORAL 3 TIMES DAILY PRN
Qty: 90 TABLET | Refills: 0 | Status: SHIPPED | OUTPATIENT
Start: 2019-03-12 | End: 2019-04-12 | Stop reason: SDUPTHER

## 2019-03-11 NOTE — TELEPHONE ENCOUNTER
02/11/19 last Rx refill  03/27/17 last office visit  06/03/19 RTC          ----- Message from Charley Humphrey sent at 3/11/2019 12:08 PM CDT -----  Contact: pt at [phone]  Needs Advice    Reason for call:Pt need refill on her Hydrocodone 10mg QTY 90.  Call in to [facility] [address] At   [phone]        Communication Preference:call when taken care of     Additional Information:

## 2019-04-12 DIAGNOSIS — M17.0 PRIMARY OSTEOARTHRITIS OF BOTH KNEES: ICD-10-CM

## 2019-04-12 RX ORDER — HYDROCODONE BITARTRATE AND ACETAMINOPHEN 10; 325 MG/1; MG/1
1 TABLET ORAL 3 TIMES DAILY PRN
Qty: 90 TABLET | Refills: 0 | Status: SHIPPED | OUTPATIENT
Start: 2019-04-13 | End: 2019-05-13 | Stop reason: SDUPTHER

## 2019-04-12 NOTE — TELEPHONE ENCOUNTER
03/11/19 last Rx refill  03/27/17 last office visit  06/03/19 RTC    ----- Message from Urszula Ruelas sent at 4/12/2019  8:21 AM CDT -----  Contact: self @ [phone]  Pt is req a refill for hydrocodone 10/325.    [facility] [address] [place] [address] [address] AT [address] & [facility] [address]                     [phone] (Phone)       [phone] (Fax)

## 2019-05-13 DIAGNOSIS — M17.0 PRIMARY OSTEOARTHRITIS OF BOTH KNEES: ICD-10-CM

## 2019-05-13 RX ORDER — HYDROCODONE BITARTRATE AND ACETAMINOPHEN 10; 325 MG/1; MG/1
1 TABLET ORAL 3 TIMES DAILY PRN
Qty: 90 TABLET | Refills: 0 | Status: SHIPPED | OUTPATIENT
Start: 2019-05-14 | End: 2019-06-13 | Stop reason: SDUPTHER

## 2019-05-13 NOTE — TELEPHONE ENCOUNTER
Last Rx refill-----03/27/17  Last office visit--04/12/19  Next office visit--06/03/19      ----- Message from Devonte Lakhani sent at 5/13/2019 10:11 AM CDT -----  Rx Refill/Request     Is this a Refill or New Rx: Refill   Rx Name and Strength: HYDROcodone-acetaminophen (NORCO)  mg per tablet   Preferred Pharmacy with phone number: see below  Communication Preference: [phone]  Additional Information:     [facility] [employer] [facility] - [place] [address] [address]zerAZ[address]  AT SmartzerAZINE & [place] [address] [employer] [facility] [address]  Phone: [phone] Fax: [phone]

## 2019-05-30 RX ORDER — FAMOTIDINE 20 MG/1
TABLET, FILM COATED ORAL
Qty: 180 TABLET | Refills: 0 | OUTPATIENT
Start: 2019-05-30

## 2019-05-30 RX ORDER — MELOXICAM 15 MG/1
TABLET ORAL
Qty: 90 TABLET | Refills: 0 | OUTPATIENT
Start: 2019-05-30

## 2019-06-13 DIAGNOSIS — M17.0 PRIMARY OSTEOARTHRITIS OF BOTH KNEES: ICD-10-CM

## 2019-06-13 RX ORDER — HYDROCODONE BITARTRATE AND ACETAMINOPHEN 10; 325 MG/1; MG/1
1 TABLET ORAL 3 TIMES DAILY PRN
Qty: 90 TABLET | Refills: 0 | Status: SHIPPED | OUTPATIENT
Start: 2019-06-13 | End: 2019-07-12 | Stop reason: SDUPTHER

## 2019-06-13 NOTE — TELEPHONE ENCOUNTER
Last Rx refill- 05/13/19  Last office visit--03/27/17, patient to RTC 3m 06/27/17  Next office visit--patient no show last three appointments scheduled.        ----- Message from Mark Alejo sent at 6/13/2019  8:08 AM CDT -----  Contact: Pt. [phone]  Rx Refill/Request     Refill Rx:      Rx Name and Strength:  HYDROcodone-acetaminophen (NORCO)  mg     Preferred Pharmacy with phone number:   [facility] [address] [place] [address] [facility] & [place] [address] [employer] [facility] [address]  Phone: [phone] Fax: [phone]      Communication Preference:PHONE     Additional Information:

## 2019-06-17 NOTE — TELEPHONE ENCOUNTER
12/10/18 Last Rx refill  03/27/17 last office visit  01/31/19 RTC            ----- Message from Devonte Lakhani sent at 1/10/2019  8:08 AM CST -----  Rx Refill/Request     Is this a Refill or New Rx: Refill  Rx Name and Strength: HYDROcodone-acetaminophen (NORCO)  mg per tablet   Preferred Pharmacy with phone number: see below  Communication Preference: [phone]  Additional Information:     [facility] [address] [place] [address] [address] [facility] [place] [address] [facility] [address]  Phone: [phone] Fax: [phone]    
Called patient no answer.  Did not see Rx refill in patient chart.    ----- Message from Nubia Adamson sent at 1/10/2019  1:47 PM CST -----  Contact: Jolynn/Florence  Please call pt Jolynn at [phone]    Need a clarification for Levofloxacin 500 mg    Patient is currently at the pharmacy    Thank you    
none

## 2019-07-12 DIAGNOSIS — M17.0 PRIMARY OSTEOARTHRITIS OF BOTH KNEES: ICD-10-CM

## 2019-07-12 RX ORDER — HYDROCODONE BITARTRATE AND ACETAMINOPHEN 10; 325 MG/1; MG/1
1 TABLET ORAL 3 TIMES DAILY PRN
Qty: 90 TABLET | Refills: 0 | Status: SHIPPED | OUTPATIENT
Start: 2019-07-13 | End: 2019-08-12 | Stop reason: SDUPTHER

## 2019-07-12 NOTE — TELEPHONE ENCOUNTER
Pt not seen since 2017  Last visit: 03/27/2017  Canceled/No Show visit: 06/27/2017, 01/31/2019, 06/03/2019  Next visit: 11/19/2019  Last refill Norco: 06/13/2019      ----- Message from Urszula Ruelas sent at 7/11/2019  4:31 PM CDT -----  Contact: self @ [phone]  Pt is req a refill for hydrocodone 10/325.    [facility] [address] [place] [address] [address]               [phone] (Phone)      [phone] (Fax)

## 2019-08-12 ENCOUNTER — TELEPHONE (OUTPATIENT)
Dept: PHYSICAL MEDICINE AND REHAB | Facility: CLINIC | Age: 64
End: 2019-08-12

## 2019-08-12 DIAGNOSIS — M17.0 PRIMARY OSTEOARTHRITIS OF BOTH KNEES: ICD-10-CM

## 2019-08-12 RX ORDER — HYDROCODONE BITARTRATE AND ACETAMINOPHEN 10; 325 MG/1; MG/1
1 TABLET ORAL 3 TIMES DAILY PRN
Qty: 90 TABLET | Refills: 0 | Status: SHIPPED | OUTPATIENT
Start: 2019-08-12 | End: 2019-09-12 | Stop reason: SDUPTHER

## 2019-08-12 NOTE — TELEPHONE ENCOUNTER
Med Comments: Pt needs to be seen prescribing doctor for more refills.   Patient last seen 03/27/17    ----- Message from Sharonda Braden sent at 8/12/2019  8:03 AM CDT -----  Contact:     Pt  [phone]  Rx Refill/Request     Is this a Refill or New Rx:     Refill    Rx Name and Strength:     HYDROcodone-acetaminophen (NORCO)  mg per tablet  Preferred Pharmacy with phone number:   [facility]armacy [phone]   Communication Preference:   Phone    Additional Information:

## 2019-09-12 DIAGNOSIS — M17.0 PRIMARY OSTEOARTHRITIS OF BOTH KNEES: ICD-10-CM

## 2019-09-12 DIAGNOSIS — L29.9 PRURITUS: ICD-10-CM

## 2019-09-12 RX ORDER — HYDROCODONE BITARTRATE AND ACETAMINOPHEN 10; 325 MG/1; MG/1
1 TABLET ORAL 3 TIMES DAILY PRN
Qty: 90 TABLET | Refills: 0 | Status: SHIPPED | OUTPATIENT
Start: 2019-09-12 | End: 2019-10-11 | Stop reason: SDUPTHER

## 2019-09-12 RX ORDER — TRIAMCINOLONE ACETONIDE 1 MG/G
CREAM TOPICAL
Qty: 454 G | Refills: 0 | OUTPATIENT
Start: 2019-09-12

## 2019-09-12 NOTE — TELEPHONE ENCOUNTER
Last Rx refill-----08/12/19  Last office visit--03/27/19  Next office visit--11/19/19      ----- Message from Demetria Calles sent at 9/12/2019  8:01 AM CDT -----  Contact: pt  Refill request for HYDROcodone-acetaminophen (NORCO)  mg per tablet

## 2019-09-17 ENCOUNTER — TELEPHONE (OUTPATIENT)
Dept: ENDOSCOPY | Facility: HOSPITAL | Age: 64
End: 2019-09-17

## 2019-10-01 ENCOUNTER — OFFICE VISIT (OUTPATIENT)
Dept: NEPHROLOGY | Facility: CLINIC | Age: 64
End: 2019-10-01
Payer: MEDICARE

## 2019-10-01 ENCOUNTER — TELEPHONE (OUTPATIENT)
Dept: PHYSICAL MEDICINE AND REHAB | Facility: CLINIC | Age: 64
End: 2019-10-01

## 2019-10-01 VITALS — OXYGEN SATURATION: 99 % | HEART RATE: 82 BPM | BODY MASS INDEX: 20.75 KG/M2 | WEIGHT: 132.5 LBS

## 2019-10-01 DIAGNOSIS — Z79.4 TYPE 2 DIABETES MELLITUS WITH MICROALBUMINURIA, WITH LONG-TERM CURRENT USE OF INSULIN: ICD-10-CM

## 2019-10-01 DIAGNOSIS — R80.9 PROTEINURIA, UNSPECIFIED TYPE: ICD-10-CM

## 2019-10-01 DIAGNOSIS — I10 ESSENTIAL HYPERTENSION: Primary | ICD-10-CM

## 2019-10-01 DIAGNOSIS — E11.29 TYPE 2 DIABETES MELLITUS WITH MICROALBUMINURIA, WITH LONG-TERM CURRENT USE OF INSULIN: ICD-10-CM

## 2019-10-01 DIAGNOSIS — R80.9 TYPE 2 DIABETES MELLITUS WITH MICROALBUMINURIA, WITH LONG-TERM CURRENT USE OF INSULIN: ICD-10-CM

## 2019-10-01 PROCEDURE — 3008F BODY MASS INDEX DOCD: CPT | Mod: CPTII,S$GLB,, | Performed by: INTERNAL MEDICINE

## 2019-10-01 PROCEDURE — 99205 OFFICE O/P NEW HI 60 MIN: CPT | Mod: S$GLB,,, | Performed by: INTERNAL MEDICINE

## 2019-10-01 PROCEDURE — 3008F PR BODY MASS INDEX (BMI) DOCUMENTED: ICD-10-PCS | Mod: CPTII,S$GLB,, | Performed by: INTERNAL MEDICINE

## 2019-10-01 PROCEDURE — 99205 PR OFFICE/OUTPT VISIT, NEW, LEVL V, 60-74 MIN: ICD-10-PCS | Mod: S$GLB,,, | Performed by: INTERNAL MEDICINE

## 2019-10-01 PROCEDURE — 99999 PR PBB SHADOW E&M-EST. PATIENT-LVL III: ICD-10-PCS | Mod: PBBFAC,,, | Performed by: INTERNAL MEDICINE

## 2019-10-01 PROCEDURE — 99999 PR PBB SHADOW E&M-EST. PATIENT-LVL III: CPT | Mod: PBBFAC,,, | Performed by: INTERNAL MEDICINE

## 2019-10-01 NOTE — PROGRESS NOTES
REASON FOR CONSULTATION:  Evaluation of chronic kidney disease and proteinuria.    She is being referred from [facility] by nurse practitioner, Mamta Odonnell.  Please fax my note to them.  My fax number is [phone].    HISTORY OF PRESENT ILLNESS:  This is a 63-year-old -American female with   longstanding history of hypertension, diabetes, hyperlipoproteinemia, is coming   in for evaluation of proteinuria and chronic kidney disease.  The patient states   that she is feeling okay.  She patient did not have any significant complaints   today except occasional right upper quadrant pain, which she has been getting   intermittently and was seen in the [facility] a few months ago.  She   was also seeing her primary care for that.  She states she denies NSAIDs,   although I see meloxicam on her list.  She denies any over-the-counter NSAIDs.    She states that she hydrates well.  Her blood pressures are stable today.  She   has an A1c of 7.4 per her records, although it was 9 in the past in 2012, per   our records.    PAST MEDICAL HISTORY:  Significant for hypertension for about 10 years, diabetes   for about 10 years, hyperlipoproteinemia, neuropathy, likely diabetic.    SOCIAL HISTORY:  She has been smoking since she was 12.  She drinks with gins.    PHYSICAL EXAMINATION:  GENERAL:  Well-nourished, well-developed individual, in no acute distress.  HEENT:  PERRLA, EOMI.  NECK:  No cervical lymphadenopathy.  CARDIOVASCULAR:  Rate and rhythm regular.  No murmurs, gallops or rubs.  LUNGS:  Clear to auscultation bilaterally.  Normal respiratory effort.  ABDOMEN:  Soft, nontender, nondistended.  EXTREMITIES:  No edema.  SKIN:  No rashes, but she states that she has dry skin and has been using   triamcinolone skin all over her body.  She appears thin.    ASSESSMENT AND PLAN:  This is a 63-year-old -American female with   longstanding history of hypertension, diabetes and proteinuria is  coming in for   evaluation.  1.  Chronic kidney disease, stage II.  She has proteinuria of 700 mg on a recent   check from last month per records, she had microalbuminuria in 2012.  She   likely has proteinuria secondary to longstanding diabetes and hypertension.  I   educated the patient about the significance of the proteinuria and ways to   control and improve it.  She is on lisinopril 40 mg to help.  Her A1c's could be   better.  Her blood pressures appear to be stable.  I encouraged her to exercise   and diet.  We will get a baseline renal ultrasound.  2.  Hypertension.  Blood pressures are stable currently.  I encouraged her to   check them at home.  She is on amlodipine 5 mg and lisinopril 40 mg per records.  3.  Renal osteodystrophy.  We will check intact PTH, calcium, phosphorus levels.  4.  Anemia.  We will check a hemoglobin and iron studies.  We will see the   patient back again in 4 to 5 months.  We will get a renal ultrasound.  She is   currently on gabapentin 600 mg three times a day, amlodipine 5 mg once a day,   lisinopril 40 mg, meloxicam 15 mg is listed and she is not clear if she is   taking it.  I encouraged her to stop it.  She is also on insulin.    Addendum:  creatinien stable at 0.9.  UA with lot of rbc's and 1.77 gms of proteinuria-will repeat to see if hematuria is persistent.  Creatinine of 1.37 from 7/19 per old records.   Please fax the note to Mamta Odonnell, nurse practitioner at [facility] at [phone].  The patient was educated about chronic kidney   disease.      ISAMAR  dd: 10/01/2019 09:16:16 (CDT)  td: 10/02/2019 00:39:19 (CDT)  Doc ID   #[id number]  Job ID #[id number]    CC:     [id number]

## 2019-10-01 NOTE — TELEPHONE ENCOUNTER
Called patient home no answer.  Please contact office back.  Patient to provide office with the company name she received her last wheelchair from.    ----- Message from Sharonda Braden sent at 10/1/2019  8:09 AM CDT -----  Contact:    Pt   [phone]  Pt  Called requesting orders from the Dr for another wheelchair ..   Give the pt a call back to verify

## 2019-10-01 NOTE — LETTER
October 1, 2019      Mamta Eason, JON  [address] [facility] [place] [address]           [facility] Nephrology  [address]  [place] [address]  Phone: [phone]  Fax: [phone]          Patient: Urszula Yeh   MR Number: [id number]   YOB: 1955   Date of Visit: 10/1/2019       Dear Mamta Eason:    Thank you for referring Urszula Yeh to me for evaluation. Attached you will find relevant portions of my assessment and plan of care.    If you have questions, please do not hesitate to call me. I look forward to following Urszula Yeh along with you.    Sincerely,    Shawn Nance, DO [facility]  CC:  No Recipients    If you would like to receive this communication electronically, please contact [email or web address] or [phone] to request more information on [employer] access.    For providers and/or their staff who would like to refer a patient to [facility], please contact us through our one-stop-shop provider referral line, [facility] [place], at [phone].    If you feel you have received this communication in error or would no longer like to receive these types of communications, please e-mail [email or web address]

## 2019-10-01 NOTE — TELEPHONE ENCOUNTER
I called the patient.  A power wheelchair is over 5 years old.  It is broken.  She has a new one.  I told her in order to get a new one, she needs a new mobility evaluation and paperwork submitted to her insurance company.  She has an appointment in 11/2019.  I told her she can call every week or so and ask or cancellation.

## 2019-10-02 ENCOUNTER — TELEPHONE (OUTPATIENT)
Dept: NEPHROLOGY | Facility: CLINIC | Age: 64
End: 2019-10-02

## 2019-10-02 DIAGNOSIS — I10 ESSENTIAL HYPERTENSION: Primary | ICD-10-CM

## 2019-10-02 NOTE — TELEPHONE ENCOUNTER
Preferred Name:   Urszula Yeh  Female, 63 y.o., 1955  Phone:   [phone] (M)  PCP:   [facility]  Language:   English  Need Interp:   No  Allergies Last Reviewed:   10/01/19  Allergies:   Penicillins  Health Maintenance:   Due  FYI  General  Primary Ins.:   [employer] MANAGED MEDICARE  MRN:   [id number]  Pt Comm Pref:   Patient Portal, Mail  Next Appt:   With Physical Medicine and Rehabilitation  11/19/2019 at 9:00 AM  My Sticky Note:     Specialty Comments:     Patient Calls     Shawn Nance DO routed conversation to [facility] Staff 19 minutes ago (8:20 AM)      Shawn Nance DO 19 minutes ago (8:20 AM)         UA with lot of rbc's and 1.7 gms of proteinuria.  Please have her repeat UA and urine protein /creat ratio ASAP.          Documentation

## 2019-10-02 NOTE — TELEPHONE ENCOUNTER
UA with lot of rbc's and 1.7 gms of proteinuria.  Please have her repeat UA and urine protein /creat ratio ASAP.

## 2019-10-11 DIAGNOSIS — M17.0 PRIMARY OSTEOARTHRITIS OF BOTH KNEES: ICD-10-CM

## 2019-10-11 RX ORDER — HYDROCODONE BITARTRATE AND ACETAMINOPHEN 10; 325 MG/1; MG/1
1 TABLET ORAL 3 TIMES DAILY PRN
Qty: 90 TABLET | Refills: 0 | Status: SHIPPED | OUTPATIENT
Start: 2019-10-12 | End: 2019-11-12 | Stop reason: SDUPTHER

## 2019-10-11 NOTE — TELEPHONE ENCOUNTER
Last Rx refill-----09/12/19  Last office visit--03/27/19  Next office visit--10/29/19        ----- Message from Nubia Adamson sent at 10/11/2019 11:26 AM    Refill request for HYDROcodone-acetaminophen (NORCO)  mg per tablet    Use [facility] #[id number] - [place] [address] [address] AT [address] [phone] (Phone)  [phone] (Fax)    Thank you

## 2019-10-29 ENCOUNTER — OFFICE VISIT (OUTPATIENT)
Dept: PHYSICAL MEDICINE AND REHAB | Facility: CLINIC | Age: 64
End: 2019-10-29
Payer: MEDICARE

## 2019-10-29 VITALS
BODY MASS INDEX: 20.24 KG/M2 | WEIGHT: 136.69 LBS | DIASTOLIC BLOOD PRESSURE: 96 MMHG | SYSTOLIC BLOOD PRESSURE: 145 MMHG | HEIGHT: 69 IN | HEART RATE: 103 BPM

## 2019-10-29 DIAGNOSIS — E11.42 DIABETIC PERIPHERAL NEUROPATHY: ICD-10-CM

## 2019-10-29 DIAGNOSIS — M54.50 CHRONIC BILATERAL LOW BACK PAIN WITHOUT SCIATICA: ICD-10-CM

## 2019-10-29 DIAGNOSIS — R26.9 GAIT DISORDER: ICD-10-CM

## 2019-10-29 DIAGNOSIS — G89.29 CHRONIC NECK PAIN: ICD-10-CM

## 2019-10-29 DIAGNOSIS — G89.29 CHRONIC BILATERAL LOW BACK PAIN WITHOUT SCIATICA: ICD-10-CM

## 2019-10-29 DIAGNOSIS — Z79.891 CHRONICALLY ON OPIATE THERAPY: ICD-10-CM

## 2019-10-29 DIAGNOSIS — M54.2 CHRONIC NECK PAIN: ICD-10-CM

## 2019-10-29 DIAGNOSIS — M17.0 PRIMARY OSTEOARTHRITIS OF BOTH KNEES: Primary | ICD-10-CM

## 2019-10-29 PROCEDURE — 3080F DIAST BP >= 90 MM HG: CPT | Mod: CPTII,S$GLB,, | Performed by: PHYSICAL MEDICINE & REHABILITATION

## 2019-10-29 PROCEDURE — 99215 OFFICE O/P EST HI 40 MIN: CPT | Mod: S$GLB,,, | Performed by: PHYSICAL MEDICINE & REHABILITATION

## 2019-10-29 PROCEDURE — 3008F PR BODY MASS INDEX (BMI) DOCUMENTED: ICD-10-PCS | Mod: CPTII,S$GLB,, | Performed by: PHYSICAL MEDICINE & REHABILITATION

## 2019-10-29 PROCEDURE — 3077F SYST BP >= 140 MM HG: CPT | Mod: CPTII,S$GLB,, | Performed by: PHYSICAL MEDICINE & REHABILITATION

## 2019-10-29 PROCEDURE — 99999 PR PBB SHADOW E&M-EST. PATIENT-LVL II: CPT | Mod: PBBFAC,,, | Performed by: PHYSICAL MEDICINE & REHABILITATION

## 2019-10-29 PROCEDURE — 3080F PR MOST RECENT DIASTOLIC BLOOD PRESSURE >= 90 MM HG: ICD-10-PCS | Mod: CPTII,S$GLB,, | Performed by: PHYSICAL MEDICINE & REHABILITATION

## 2019-10-29 PROCEDURE — 99999 PR PBB SHADOW E&M-EST. PATIENT-LVL II: ICD-10-PCS | Mod: PBBFAC,,, | Performed by: PHYSICAL MEDICINE & REHABILITATION

## 2019-10-29 PROCEDURE — 99215 PR OFFICE/OUTPT VISIT, EST, LEVL V, 40-54 MIN: ICD-10-PCS | Mod: S$GLB,,, | Performed by: PHYSICAL MEDICINE & REHABILITATION

## 2019-10-29 PROCEDURE — 3077F PR MOST RECENT SYSTOLIC BLOOD PRESSURE >= 140 MM HG: ICD-10-PCS | Mod: CPTII,S$GLB,, | Performed by: PHYSICAL MEDICINE & REHABILITATION

## 2019-10-29 PROCEDURE — 3008F BODY MASS INDEX DOCD: CPT | Mod: CPTII,S$GLB,, | Performed by: PHYSICAL MEDICINE & REHABILITATION

## 2019-10-29 RX ORDER — VENLAFAXINE 50 MG/1
50 TABLET ORAL 3 TIMES DAILY
Qty: 90 TABLET | Refills: 3 | Status: SHIPPED | OUTPATIENT
Start: 2019-10-29 | End: 2019-11-28

## 2019-10-29 NOTE — PROGRESS NOTES
Subjective:       Patient ID: Urszula Yeh is a 63 y.o. female.    Chief Complaint: No chief complaint on file.    HPI     HISTORY OF PRESENT ILLNESS:  Mrs. Yeh is a 63-year-old black female with HTN, DM, peripheral neuropathy, gouty arthritis, OA and multiple lower extremity procedures secondary to fractures.  She is followed up in the Physical Medicine Clinic for painful peripheral neuropathy and for OA of the knees.  Her last visit to the clinic was on 3/27/17.  She was maintained on Celebrex, venlafaxine, Lyrica,p.r.n. hydrocodone/APAP and p.r.n. trazodone.      She is coming today to the clinic to reestablish care..  Her knee pain has been stable.  It is a constant aching pain. It is worse with standing or walking. It is better with sitting down or lying down.  Her maximum pain is 9-10/10 and minimum 5-6/10.  Today, it is 5-6/10.  The patient has occasional swelling of her knees. She denies any warmth.    Her bilateral foot numbness has been stable.  It is a constant dysesthetic sensation.  Her maximum pain is 10/10 and minimum 5-6/10.  Today, it is 7-8/10.    She has been complaining of chronic low back pain.  It is a constant stabbing pain in the lumbar spine and across her back.  She denies any radiation to her legs. I is aggravated by activity.  Her maximum pain is 9-10/10 and minimum 7-8/10.  Today it is 7-8/10.  Patient complains bilateral extremity weakness.  She has been ambulating using a straight cane.  He is interested in getting a scooter.  She denies any bowel or bladder incontinence.    She has also been complaining chronic neck pain.  It is a constant aching pain in the cervical spine.  She does not have any clear radiation to her hands but does complain of occasional hand numbness associated with her neck pain.  Her pain is worse with neck movement.  Her max pain is 7-8/10 and minimum 5-6/10.  Today it is 05/06/2010.  Patient denies any significant upper extremity weakness.  She has mild  gait imbalance.    The patient is not clear on her medications.  She was on Celebrex last visit but states that she has been out of it for few months.  Her medication records show gabapentin and Lyrica.  She is not sure which one she is taking p.o. However the [place] Prescription Monitoring program () did not show any Lyrica refills for over a year.  She recalls that gabapentin made her sleepy.  She she has been reportedly out of venlafaxine.  She is reportedly out of it for few months.  She takes hydrocodone/APAP 10/325 p.r.n., reportedly twice per day consistently.        Review of Systems   Constitutional: Positive for fatigue.   Eyes: Negative for visual disturbance.   Respiratory: Negative for shortness of breath.    Cardiovascular: Negative for chest pain.   Gastrointestinal: Negative for blood in stool, constipation, nausea and vomiting.   Musculoskeletal: Positive for arthralgias (knees), back pain, gait problem and neck pain. Negative for joint swelling.   Neurological: Negative for dizziness and headaches.   Psychiatric/Behavioral: Positive for sleep disturbance. Negative for behavioral problems.       Objective:      Physical Exam   Constitutional: She appears well-developed and well-nourished. No distress.   Coming to the clinic in a scooter     HENT:   Head: Normocephalic and atraumatic.   Musculoskeletal:   BUE:  ROM:   RUE: full.   LUE: full.  Strength:    RUE: 5-/5 at shoulder abduction, 5- elbow flexion, 5- elbow extension, 5 hand .   LUE: 5-/5 at shoulder abduction, 5- elbow flexion, 5- elbow extension, 5 hand .  Sensation to pinprick:   RUE: intact.   LUE: intact.  DTR:    RUE: +1 biceps, +1 triceps.   LUE:  +1 biceps, +1 triceps.  Szymanski:   RUE: -ve.   LUE: -ve.    BLE:  ROM:   RLE: full. Healed Rt knee surgical scar.   LLE: full.  Knee crepitus:   RLE: +ve.   LLE: +ve.   Strength:    RLE: 4/5 at hip flexion, 5 knee extension, 5 ankle DF, 5 PF.   LLE: 4/5 at hip flexion, 5 knee  extension, 5 ankle DF, 5 PF.  Sensation to pinprick:     RLE: intact.      LLE: intact.   DTR:     RLE: +1 knee, +1 ankle.    LLE: +1 knee, +1 ankle.  Clonus:    Rt ankle: -ve.    Lt ankle: -ve.  SLR (sitting):      RLE: -ve.      LLE: -ve.    -ve tenderness over lumbar spine.    Gait: slow jayce, stooped posture.       Skin: Skin is warm.   Psychiatric: She has a normal mood and affect.       Assessment:       1. Primary osteoarthritis of both knees    2. Diabetic peripheral neuropathy    3. Gait disorder    4. Chronically on opiate therapy       Plan:      - Restart venlafaxine (EFFEXOR) 50 MG Tab; Take 1 tablet (50 mg total) by mouth 3 (three) times daily.  - Continue hydrocodone-acetaminophen 10-325mg (NORCO) 7.5-325 mg per tablet; Take 1 tablet by mouth 3 (three) times daily as needed for Pain.    - Regular home exercise program was encouraged.  - She was interested in getting a scooter.  He was informed this knee is a separate mobility evaluation. She was also informed about the criteria for insurance to cover a scooter, namely that she has to have mobility restrictions at home not help with a cane, walker or a manual wheelchair.  - She had an RTA form.  She was asked to fill her portion before we can fill the doctor's section.  - Follow up in about 3 months (around 1/29/2020).     This was a 40 minute visit, 50% of which was spent educating the patient about the diagnosis and the treatment plan.

## 2019-11-11 ENCOUNTER — TELEPHONE (OUTPATIENT)
Dept: ENDOSCOPY | Facility: HOSPITAL | Age: 64
End: 2019-11-11

## 2019-11-11 NOTE — TELEPHONE ENCOUNTER
2nd attempt:  Referral for colonoscopy received from JON Foley,[facility].Medical records scanned in chart under Media tab. Unable to reach letter mailed.

## 2019-11-12 DIAGNOSIS — M17.0 PRIMARY OSTEOARTHRITIS OF BOTH KNEES: ICD-10-CM

## 2019-11-12 NOTE — TELEPHONE ENCOUNTER
duplicate message    ----- Message from Ernesto Nenajas sent at 2019  1:23 PM CST -----  Contact: Pt  Rx Refill/Request     Is this a Refill or New Rx:  Refill    Rx Name and Strength:   HYDROcodone-acetaminophen (NORCO)  mg per tablet ()     Preferred Pharmacy with phone number: [facility] #[id number] [place] [address] [address] AT [address]                                                PHONE # [phone]    Communication Preference:[phone]    Additional Information: The Pt states that she called at 8:00 am for this Rx and would like to have it called in please.

## 2019-11-12 NOTE — TELEPHONE ENCOUNTER
----- Message from Tess Hernandez sent at 11/12/2019 11:23 AM CST -----  Contact: pt @[phone]  Refill     HYDROcodone-acetaminophen (NORCO)  mg per tablet    Pharmacy  [facility] #[id number]  [address] [facility] [address] [phone]

## 2019-11-12 NOTE — TELEPHONE ENCOUNTER
Last Rx refill-----10/11/19  Last office visit--10/29/19  Next office visit--11/20/19        ----- Message from Sis Conn sent at 11/12/2019  8:02 AM CST -----  Contact: Self [phone]  Pt states she needs a refill on HYDROcodone-acetaminophen (NORCO)  mg per tablet     Please send [facility] #[id number] - [place], [place] - [address] AT [address]

## 2019-11-13 RX ORDER — HYDROCODONE BITARTRATE AND ACETAMINOPHEN 10; 325 MG/1; MG/1
1 TABLET ORAL 3 TIMES DAILY PRN
Qty: 90 TABLET | Refills: 0 | Status: SHIPPED | OUTPATIENT
Start: 2019-11-13 | End: 2019-12-11 | Stop reason: SDUPTHER

## 2019-11-20 ENCOUNTER — OFFICE VISIT (OUTPATIENT)
Dept: PHYSICAL MEDICINE AND REHAB | Facility: CLINIC | Age: 64
End: 2019-11-20
Payer: MEDICARE

## 2019-11-20 VITALS
DIASTOLIC BLOOD PRESSURE: 79 MMHG | SYSTOLIC BLOOD PRESSURE: 137 MMHG | WEIGHT: 130.06 LBS | BODY MASS INDEX: 20.41 KG/M2 | HEIGHT: 67 IN | HEART RATE: 81 BPM

## 2019-11-20 DIAGNOSIS — G89.29 CHRONIC NECK PAIN: ICD-10-CM

## 2019-11-20 DIAGNOSIS — M54.2 CHRONIC NECK PAIN: ICD-10-CM

## 2019-11-20 DIAGNOSIS — R26.9 GAIT DISORDER: Primary | ICD-10-CM

## 2019-11-20 DIAGNOSIS — M54.50 CHRONIC BILATERAL LOW BACK PAIN WITHOUT SCIATICA: ICD-10-CM

## 2019-11-20 DIAGNOSIS — E11.42 DIABETIC PERIPHERAL NEUROPATHY: ICD-10-CM

## 2019-11-20 DIAGNOSIS — G89.29 CHRONIC BILATERAL LOW BACK PAIN WITHOUT SCIATICA: ICD-10-CM

## 2019-11-20 DIAGNOSIS — M17.0 PRIMARY OSTEOARTHRITIS OF BOTH KNEES: ICD-10-CM

## 2019-11-20 DIAGNOSIS — R29.6 FREQUENT FALLS: ICD-10-CM

## 2019-11-20 PROCEDURE — 99999 PR PBB SHADOW E&M-EST. PATIENT-LVL II: CPT | Mod: PBBFAC,,, | Performed by: PHYSICAL MEDICINE & REHABILITATION

## 2019-11-20 PROCEDURE — 99215 OFFICE O/P EST HI 40 MIN: CPT | Mod: S$GLB,,, | Performed by: PHYSICAL MEDICINE & REHABILITATION

## 2019-11-20 PROCEDURE — 3078F PR MOST RECENT DIASTOLIC BLOOD PRESSURE < 80 MM HG: ICD-10-PCS | Mod: CPTII,S$GLB,, | Performed by: PHYSICAL MEDICINE & REHABILITATION

## 2019-11-20 PROCEDURE — 99999 PR PBB SHADOW E&M-EST. PATIENT-LVL II: ICD-10-PCS | Mod: PBBFAC,,, | Performed by: PHYSICAL MEDICINE & REHABILITATION

## 2019-11-20 PROCEDURE — 3008F BODY MASS INDEX DOCD: CPT | Mod: CPTII,S$GLB,, | Performed by: PHYSICAL MEDICINE & REHABILITATION

## 2019-11-20 PROCEDURE — 3008F PR BODY MASS INDEX (BMI) DOCUMENTED: ICD-10-PCS | Mod: CPTII,S$GLB,, | Performed by: PHYSICAL MEDICINE & REHABILITATION

## 2019-11-20 PROCEDURE — 3075F PR MOST RECENT SYSTOLIC BLOOD PRESS GE 130-139MM HG: ICD-10-PCS | Mod: CPTII,S$GLB,, | Performed by: PHYSICAL MEDICINE & REHABILITATION

## 2019-11-20 PROCEDURE — 3078F DIAST BP <80 MM HG: CPT | Mod: CPTII,S$GLB,, | Performed by: PHYSICAL MEDICINE & REHABILITATION

## 2019-11-20 PROCEDURE — 99215 PR OFFICE/OUTPT VISIT, EST, LEVL V, 40-54 MIN: ICD-10-PCS | Mod: S$GLB,,, | Performed by: PHYSICAL MEDICINE & REHABILITATION

## 2019-11-20 PROCEDURE — 3075F SYST BP GE 130 - 139MM HG: CPT | Mod: CPTII,S$GLB,, | Performed by: PHYSICAL MEDICINE & REHABILITATION

## 2019-11-20 NOTE — PROGRESS NOTES
Subjective:       Patient ID: Urszula Yeh is a 64 y.o. female.    Chief Complaint: No chief complaint on file.    HPI     HISTORY OF PRESENT ILLNESS:  Mrs. Yeh is a 64-year-old black female who is presenting to the Physical Medicine Clinic for evaluation for a power mobility device.  Her past medical history significant for HTN, DM, peripheral neuropathy, gouty arthritis, OA, multiple lower extremity procedures secondary to fractures, chronic low back pain and chronic neck pain.  She is followed up in the Physical Medicine Clinic for her pain management.    The patient lives with her daughter on the 1st floor of a 2 story home with 4 steps to enter.  She is independent with feeding, dressing, grooming and toileting.  She is independent with bathing.  She can ambulate with a straight cane about 25-30 feet when her symptoms are active.  She is restricted by chronic low back pain (up to 9 to 10/10), bilateral knee pain (9 to 10/10), and painful bilateral neuropathic foot pain.  The patient cannot propel a manual wheelchair due to fatigue and the bilateral shoulder pain due to arthritis (up to 9 to 10/10).  She reports history of frequent falls, sometimes once per month with blunt trauma to limbs and with bruises.  She had scooter over 5 years ago.  It is currently in disrepair.  She is looking for a replacement.          Review of Systems   Constitutional: Positive for fatigue.   Eyes: Positive for visual disturbance.   Respiratory: Negative for shortness of breath.    Cardiovascular: Negative for chest pain.   Gastrointestinal: Positive for nausea. Negative for blood in stool, constipation and vomiting.   Musculoskeletal: Positive for arthralgias (knees), back pain, gait problem and neck pain. Negative for joint swelling.   Neurological: Positive for headaches. Negative for dizziness.   Psychiatric/Behavioral: Positive for sleep disturbance. Negative for behavioral problems.       Objective:      Physical Exam    Constitutional: She appears well-developed and well-nourished. No distress.   Coming to the clinic in a scooter     HENT:   Head: Normocephalic and atraumatic.   Neck: Normal range of motion.   Pain at end range.  Mild tenderness.   Cardiovascular: Normal rate and regular rhythm.   Pulmonary/Chest: Effort normal and breath sounds normal.   Abdominal: Soft.   Musculoskeletal:   BUE:  ROM:   RUE: full.   LUE: full.  Strength:    RUE: 5-/5 at shoulder abduction, 5- elbow flexion, 5- elbow extension, 5 hand .   LUE: 5-/5 at shoulder abduction, 5- elbow flexion, 5- elbow extension, 5 hand .  Sensation to pinprick:   RUE: intact.   LUE: intact.  DTR:    RUE: +1 biceps, +1 triceps.   LUE:  +1 biceps, +1 triceps.  Szymanski:   RUE: -ve.   LUE: -ve.    BLE:  ROM:   RLE: full. Healed Rt knee surgical scar.   LLE: full.  Knee crepitus:   RLE: +ve.   LLE: +ve.   Strength:    RLE: 3/5 at hip flexion, 4 knee extension, 5- ankle DF, 5- PF.   LLE: 3/5 at hip flexion, 4 knee extension, 5- ankle DF, 5- PF.  Sensation to pinprick:     RLE: intact.      LLE: intact.   DTR:     RLE: +1 knee, +1 ankle.    LLE: +1 knee, +1 ankle.  Clonus:    Rt ankle: -ve.    Lt ankle: -ve.  SLR (sitting):      RLE: +ve.      LLE: +ve.    +ve mild tenderness over lumbar spine.    Gait: slow jayce, stooped posture, using a straight cane.       Skin: Skin is warm.   Psychiatric: She has a normal mood and affect.       Assessment:       1. Gait disorder    2. Primary osteoarthritis of both knees    3. Diabetic peripheral neuropathy    4. Chronic bilateral low back pain without sciatica    5. Chronic neck pain    6. Frequent falls       Summary/Plan:            - The patient was seen today for mobility evaluation for a power mobility device due to significant impairment at home.  - The patient has multifactorial gait impairment.  - The patient is not able to ambulate safely to the kitchen or living room.  - The patient is unable to use a walker  functional distances due to chronic low back pain due to DJD, neuropathic pain due to diabetes mellitus, and impaired balance likely due to neuropathy and debility..  - The patient is unable to use an optimally-configured manual wheelchair at home due to fatigue, bilateral shoulder pain (up to 9 to 10/10).  - The patient has history of falls, which could be detrimental to her health.  - The patient has intact cognition and should be able to use a power mobility device well at home.  - The patient was given a prescription for an electric scooter (to replace her broken 1).  - The patient has enough upper & lower extremity strength to be able to transfer to and from the power mobility device.  - The patient has enough range of motion & strength in BUE to allow functional operation of the tiller.  - The patient has good trunk balance and should be able to maintain a safe posture while operating a power mobility device.  - This will allow the patient to go safely to the kitchen, dining room or living room for feeding & socialization.   - The patient is to return the Physical Medicine/Mobilityin 4 months.      This was a 40 minute visit, 50% of which was spent educating the patient about the diagnosis and the treatment plan.      This note was generated with [employer] voice recognition software. I apologize for any possible typographical errors.

## 2019-12-11 DIAGNOSIS — M17.0 PRIMARY OSTEOARTHRITIS OF BOTH KNEES: ICD-10-CM

## 2019-12-11 RX ORDER — HYDROCODONE BITARTRATE AND ACETAMINOPHEN 10; 325 MG/1; MG/1
1 TABLET ORAL 3 TIMES DAILY PRN
Qty: 90 TABLET | Refills: 0 | Status: SHIPPED | OUTPATIENT
Start: 2019-12-13 | End: 2020-01-10 | Stop reason: SDUPTHER

## 2019-12-11 NOTE — TELEPHONE ENCOUNTER
----- Message from Devonte Lakhani sent at 12/11/2019  8:03 AM CST -----  Rx Refill/Request     Is this a Refill or New Rx: Refill   Rx Name and Strength:  HYDROcodone-acetaminophen (NORCO)  mg per tablet  Preferred Pharmacy with phone number: see below  Communication Preference: [phone]  Additional Information:     [id number] - [place] [address] [address] [place] [address] [address] [address]  Phone: [phone] Fax: [phone]

## 2019-12-16 ENCOUNTER — TELEPHONE (OUTPATIENT)
Dept: ENDOSCOPY | Facility: HOSPITAL | Age: 64
End: 2019-12-16

## 2019-12-16 DIAGNOSIS — Z12.11 SPECIAL SCREENING FOR MALIGNANT NEOPLASMS, COLON: Primary | ICD-10-CM

## 2019-12-16 RX ORDER — POLYETHYLENE GLYCOL 3350, SODIUM SULFATE ANHYDROUS, SODIUM BICARBONATE, SODIUM CHLORIDE, POTASSIUM CHLORIDE 236; 22.74; 6.74; 5.86; 2.97 G/4L; G/4L; G/4L; G/4L; G/4L
4 POWDER, FOR SOLUTION ORAL ONCE
Qty: 4000 ML | Refills: 0 | Status: SHIPPED | OUTPATIENT
Start: 2019-12-16 | End: 2019-12-16

## 2020-01-10 DIAGNOSIS — M17.0 PRIMARY OSTEOARTHRITIS OF BOTH KNEES: ICD-10-CM

## 2020-01-10 RX ORDER — HYDROCODONE BITARTRATE AND ACETAMINOPHEN 10; 325 MG/1; MG/1
1 TABLET ORAL 3 TIMES DAILY PRN
Qty: 90 TABLET | Refills: 0 | Status: SHIPPED | OUTPATIENT
Start: 2020-01-11 | End: 2020-02-10 | Stop reason: SDUPTHER

## 2020-01-10 NOTE — TELEPHONE ENCOUNTER
Last Rx refill-----12/11/19  Last office visit--11/20/19  Next office visit--03/27/20        ----- Message from Kiesha Gustafson sent at 1/10/2020 10:04 AM CST -----  Contact: Urszula  Rx Refill/Request     Is this a Refill or New Rx:  Refill  Rx Name and Strength:  HYDROcodone-acetaminophen (NORCO)  mg per tablet  Preferred Pharmacy with phone number: [facility] #[id number] [place] [address] [address] [place] [address] [address] [facility] [address] [facility] [address]  Phone: [phone] Fax: [phone]  Not a 24 hour pharmacy; exact hours not known  Communication Preference:[phone]  Additional Information:

## 2020-01-28 ENCOUNTER — OFFICE VISIT (OUTPATIENT)
Dept: DERMATOLOGY | Facility: CLINIC | Age: 65
End: 2020-01-28
Payer: MEDICARE

## 2020-01-28 DIAGNOSIS — L98.9 DISEASE OF SKIN AND SUBCUTANEOUS TISSUE: Primary | ICD-10-CM

## 2020-01-28 DIAGNOSIS — L29.9 PRURITUS: ICD-10-CM

## 2020-01-28 PROCEDURE — 99999 PR PBB SHADOW E&M-EST. PATIENT-LVL II: CPT | Mod: PBBFAC,,, | Performed by: DERMATOLOGY

## 2020-01-28 PROCEDURE — 88305 TISSUE EXAM BY PATHOLOGIST: CPT | Performed by: DERMATOLOGY

## 2020-01-28 PROCEDURE — 99214 OFFICE O/P EST MOD 30 MIN: CPT | Mod: 25,S$GLB,, | Performed by: DERMATOLOGY

## 2020-01-28 PROCEDURE — 11104 PR PUNCH BIOPSY, SKIN, SINGLE LESION: ICD-10-PCS | Mod: S$GLB,,, | Performed by: DERMATOLOGY

## 2020-01-28 PROCEDURE — 88312 SPECIAL STAINS GROUP 1: CPT | Performed by: DERMATOLOGY

## 2020-01-28 PROCEDURE — 88305 TISSUE EXAM BY PATHOLOGIST: ICD-10-PCS | Mod: 26,,, | Performed by: DERMATOLOGY

## 2020-01-28 PROCEDURE — 88312 PR  SPECIAL STAINS,GROUP I: ICD-10-PCS | Mod: 26,,, | Performed by: DERMATOLOGY

## 2020-01-28 PROCEDURE — 99999 PR PBB SHADOW E&M-EST. PATIENT-LVL II: ICD-10-PCS | Mod: PBBFAC,,, | Performed by: DERMATOLOGY

## 2020-01-28 PROCEDURE — 88312 SPECIAL STAINS GROUP 1: CPT | Mod: 26,,, | Performed by: DERMATOLOGY

## 2020-01-28 PROCEDURE — 88305 TISSUE EXAM BY PATHOLOGIST: CPT | Mod: 26,,, | Performed by: DERMATOLOGY

## 2020-01-28 PROCEDURE — 11104 PUNCH BX SKIN SINGLE LESION: CPT | Mod: S$GLB,,, | Performed by: DERMATOLOGY

## 2020-01-28 PROCEDURE — 99214 PR OFFICE/OUTPT VISIT, EST, LEVL IV, 30-39 MIN: ICD-10-PCS | Mod: 25,S$GLB,, | Performed by: DERMATOLOGY

## 2020-01-28 RX ORDER — MOMETASONE FUROATE 1 MG/G
OINTMENT TOPICAL DAILY
Qty: 45 G | Refills: 3 | Status: SHIPPED | OUTPATIENT
Start: 2020-01-28 | End: 2020-09-03 | Stop reason: SDUPTHER

## 2020-01-28 NOTE — PROGRESS NOTES
Subjective:       Patient ID:  Urszula Yeh is a 64 y.o. female who presents for   Chief Complaint   Patient presents with    Rash     left upper back     History of Present Illness: The patient presents with chief complaint of rash.  Location: left upper back  Duration: one year  Signs/Symptoms: itching    Prior treatments: triamcinolone cream    Rash  - Initial  Affected locations: back  Duration: 1 year  Signs / symptoms: itching  Severity: moderate to severe  Timing: constant  Aggravated by: scratching  Relieving factors/Treatments tried: nothing  Improvement on treatment: no relief        Review of Systems   Skin: Positive for itching and rash. Negative for daily sunscreen use, activity-related sunscreen use, recent sunburn and wears hat.   Hematologic/Lymphatic: Bruises/bleeds easily.        Objective:    Physical Exam   Constitutional: She appears well-developed and well-nourished. She is in a wheelchair.  No distress.   Neurological: She is alert and oriented to person, place, and time. She is not disoriented.   Psychiatric: She has a normal mood and affect.   Skin:   Areas Examined (abnormalities noted in diagram):   Scalp / Hair Palpated and Inspected  Head / Face Inspection Performed  Neck Inspection Performed  Chest / Axilla Inspection Performed  Abdomen Inspection Performed  Genitals / Buttocks / Groin Inspection Performed  Back Inspection Performed  RUE Inspected  LUE Inspection Performed  RLE Inspected  LLE Inspection Performed  Nails and Digits Inspection Performed              Diagram Legend     Erythematous scaling macule/papule c/w actinic keratosis       Vascular papule c/w angioma      Pigmented verrucoid papule/plaque c/w seborrheic keratosis      Yellow umbilicated papule c/w sebaceous hyperplasia      Irregularly shaped tan macule c/w lentigo     1-2 mm smooth white papules consistent with Milia      Movable subcutaneous cyst with punctum c/w epidermal inclusion cyst      Subcutaneous  movable cyst c/w pilar cyst      Firm pink to brown papule c/w dermatofibroma      Pedunculated fleshy papule(s) c/w skin tag(s)      Evenly pigmented macule c/w junctional nevus     Mildly variegated pigmented, slightly irregular-bordered macule c/w mildly atypical nevus      Flesh colored to evenly pigmented papule c/w intradermal nevus       Pink pearly papule/plaque c/w basal cell carcinoma      Erythematous hyperkeratotic cursted plaque c/w SCC      Surgical scar with no sign of skin cancer recurrence      Open and closed comedones      Inflammatory papules and pustules      Verrucoid papule consistent consistent with wart     Erythematous eczematous patches and plaques     Dystrophic onycholytic nail with subungual debris c/w onychomycosis     Umbilicated papule    Erythematous-base heme-crusted tan verrucoid plaque consistent with inflamed seborrheic keratosis     Erythematous Silvery Scaling Plaque c/w Psoriasis     See annotation      Assessment / Plan:      Pathology Orders:     Normal Orders This Visit    Specimen to Pathology, Dermatology     Questions:    Procedure Type:  Dermatology and skin neoplasms    Number of Specimens:  1    ------------------------:  -------------------------    Spec 1 Procedure:  Biopsy    Spec 1 Clinical Impression:  r/o morphea vs. LsetA vs. neuroderm (hard indurated area)    Spec 1 Source:  left upper back        Disease of skin and subcutaneous tissue  -     Specimen to Pathology, Dermatology  Punch biopsy procedure note:  Punch biopsy performed after verbal consent obtained. Area marked and prepped with alcohol. Approximately 1cc of 1% lidocaine with epinephrine injected. 4 mm disposable punch used to remove lesion. Hemostasis obtained and biopsy site closed with 1 - 2 Prolene sutures. Wound care instructions reviewed with patient and handout given.    Pruritus  -     mometasone (ELOCON) 0.1 % ointment; Apply topically once daily. To left upper back  Dispense: 45 g;  Refill: 3    Good skin care regimen discussed including limiting to one bath or shower/day, using lukewarm water with mild soap and moisturizing cream to skin 1 - 2x/day. Brochure was provided and reviewed with patient.  - switch to warm water, dove senstive skin, cerave anti-itch cream BID-QID all over, and free and clear detergent, use mometasone ointment only to left upper back          No follow-ups on file.

## 2020-01-28 NOTE — LETTER
January 28, 2020      Ronit Bear, PAULA  [address] [facility] [address]           [facility] - Dermatology  [address]  [place] [address]  Phone: [phone]  Fax: [phone]          Patient: Urszula Yeh   MR Number: [id number]   YOB: 1955   Date of Visit: 1/28/2020       Dear Ronit Bear:    Thank you for referring Urszula Yeh to me for evaluation. Attached you will find relevant portions of my assessment and plan of care.    If you have questions, please do not hesitate to call me. I look forward to following Urszula Yeh along with you.    Sincerely,    Ina Dunn MD    Enclosure  CC:  No Recipients    If you would like to receive this communication electronically, please contact [email or web address] or [phone] to request more information on [facility] access.    For providers and/or their staff who would like to refer a patient to [facility], please contact us through our one-stop-shop provider referral line, [facility], at [phone].    If you feel you have received this communication in error or would no longer like to receive these types of communications, please e-mail [email or web address]

## 2020-01-28 NOTE — PATIENT INSTRUCTIONS
- switch to warm water, dove senstive skin, cerave anti-itch cream BID-QID all over, and free and clear detergent, use mometasone ointment only to left upper back   XEROSIS (DRY SKIN)        1. Definition    Xerosis is the term for dry skin.  We all have a natural oil coating over our skin produced by the skin oil glands.  If this oil is removed, the skin becomes dry which can lead to cracking, which can lead to inflammation.  Xerosis is usually a long-term problem that recurs often, especially in the winter.    2. Cause     Long hot baths or showers can remove our natural oil and lead to xerosis.  One should never take more than one bath or shower a day and for no longer than ten minutes.   Use of harsh soaps such as Zest, Dial, and Ivory can worsen and cause xerosis.   Cold winter weather worsens xerosis because the amount of moisture contained in cold air is much less than the amount of moisture in warm air.    3. Treatment     Treatment is intended to restore the natural oil to your skin.  Keep the skin lubricated.     Do not take more than one bath or shower a day.  Use lukewarm water, not hot.  Hot water dries out the skin.     Use a gentle moisturizing soap such as Cetaphil soap, Oil of Olay, Dove, Basis, Ivory moisture care, Restoraderm cleanser.     When toweling dry, dont rub.  Blot the skin so there is still some water left on the skin.  You should apply a moisturizing cream to all of the skin such as Cerave cream, Cetaphil cream, Restoraderm or Eucerin Original Formula cream.   Alpha hydroxyacid lotions, i.e., AmLactin, also work very well for preventing dry skin, but may burn when used on inflamed or reddened skin.     If you like to swim during the winter months, you should not use soap when getting out of the pool.  When you have finished swimming, rinse off the chlorine with cool to warm water.  If this will be the only shower of the day, then you may use Cetaphil or another mild soap to  "cleanse your skin.  After the shower, apply a moisturizing cream to all of the skin as above.        [address] [facility] [place] [address]/ [phone] FAX/ [email or web address]  Punch Biopsy Wound Care    Your doctor has performed a punch biopsy today.  A band aid and antibiotic ointment has been placed over the site.  This should remain in place for 24 hours.  It is recommended that you keep the area dry for the first 24 hours.  After 24 hours, you may remove the band aid and wash the area with warm soap and water and apply Vaseline jelly.  Many patients prefer to use Neosporin or Bacitracin ointment.  This is acceptable; however know that you can develop an allergy to this medication even if you have used it safely for years.  It is important to keep the area moist.  Letting it dry out and get air slows healing time, will worsen the scar, and make it more difficult to remove the stitches if they were placed.  Band aid is optional after first 24 hours.      If you notice increasing redness, tenderness, pain, or yellow drainage at the biopsy or surgical site, please notify your doctor.  These are signs of an infection.    If your biopsy/surgical site is bleeding, apply firm pressure for 15 minutes straight.  Repeat for another 15 minutes, if it is still bleeding.   If the surgical site continues to bleed, then please contact your doctor.      For [facility] users:   You will receive a [facility] notification after the pathologist has finished reviewing your biopsy specimen. Pathology results, however, will not be released online so you will see a "no content" message. Once your dermatologist reviews and clinically correlates your biopsy results, you will either receive a letter in the mail with the results of a phone call from your doctor's office if further explanation or treatment is warranted.       [address] [facility] [place] [address]/ [phone] FAX/ " [email or web address]

## 2020-02-05 ENCOUNTER — TELEPHONE (OUTPATIENT)
Dept: DERMATOLOGY | Facility: CLINIC | Age: 65
End: 2020-02-05

## 2020-02-05 NOTE — TELEPHONE ENCOUNTER
----- Message from Fior Schaeffer sent at 2/5/2020 10:21 AM CST -----  Contact: [facility]   Israel is calling to speak with the nurse to reschedule the pts post op appt can you please call Israel at [phone]    KATHLEEN

## 2020-02-06 LAB
FINAL PATHOLOGIC DIAGNOSIS: NORMAL
GROSS: NORMAL
MICROSCOPIC EXAM: NORMAL

## 2020-02-10 DIAGNOSIS — M17.0 PRIMARY OSTEOARTHRITIS OF BOTH KNEES: ICD-10-CM

## 2020-02-10 RX ORDER — HYDROCODONE BITARTRATE AND ACETAMINOPHEN 10; 325 MG/1; MG/1
1 TABLET ORAL 3 TIMES DAILY PRN
Qty: 90 TABLET | Refills: 0 | Status: SHIPPED | OUTPATIENT
Start: 2020-02-10 | End: 2020-03-10 | Stop reason: SDUPTHER

## 2020-02-10 NOTE — TELEPHONE ENCOUNTER
Duplicated message request.    ----- Message from Bibi Danielle MA sent at 2/10/2020 10:13 AM CST -----  Contact: self/[phone]  ..refill request     HYDROcodone-acetaminophen (NORCO)  mg per tablet     [id number] [place] [address] [facility] & [place] [address] [facility] [address]  Phone: [phone] Fax: [phone]

## 2020-02-10 NOTE — TELEPHONE ENCOUNTER
Called and spoke with patient.  Patient informed me that her medication is due today.  Patient was informed that her message was receivedfor her refill and to send her next medication request 3-4 days before the due date.  Patient was ok with this.      ----- Message from Tiffanie Briones sent at 2/10/2020 11:42 AM CST -----  Rx Refill/Request     Is this a Refill or New Rx:  Refill    Rx Name and Strength:  HYDROcodone-acetaminophen (NORCO)  mg per tablet    Preferred Pharmacy with phone number:   [id number] [place] [address] [facility] & [place] [address] [facility] [address]  Phone: [phone] Fax: [phone]    Communication Preference:pt@ [phone]  Additional Information:

## 2020-02-10 NOTE — TELEPHONE ENCOUNTER
Called patient number, no answer, answer machine if any did not come on.  Patient refill request will be forward to the Doctor.    Last Rx refill-----01/10/20  Last office visit--11/20/19  Next office visit--03/27/20        ----- Message from Lisbeth Naranjo sent at 2/10/2020  8:02 AM CST -----  Contact: patient  Please call above patient at [phone] need refill on hydrocodone 10 mg waiting on a call from the nurse thanks.

## 2020-02-14 ENCOUNTER — TELEPHONE (OUTPATIENT)
Dept: PHYSICAL MEDICINE AND REHAB | Facility: CLINIC | Age: 65
End: 2020-02-14

## 2020-02-14 NOTE — TELEPHONE ENCOUNTER
MD Marce Staton MA   Caller: Unspecified (1 week ago, 10:21 AM)             I called Brenda with [employer] and left voicemail couple times over the last 2 days.   Her voicemail as to call Zulma Zaragoza at [phone].  I called when the left a voicemail.    Previous Messages                 ----- Message from Dex Joseph sent at 2/14/2020 10:06 AM CST -----  Contact: Brenda palacios [facility] @ [phone] ext [phone]  Caller requesting a return call regarding a  Peer to peer for the power scooter, hospitals call and   Reference this  # [id number] the peer to peer expires on  2-17-20

## 2020-02-19 ENCOUNTER — TELEPHONE (OUTPATIENT)
Dept: PHYSICAL MEDICINE AND REHAB | Facility: CLINIC | Age: 65
End: 2020-02-19

## 2020-02-19 NOTE — TELEPHONE ENCOUNTER
MD Marce Staton MA   Caller: Unspecified (2 days ago,  1:56 PM)             I called Dr. Oakley without answer.  I left a voicemail.    Previous Messages                 ----- Message from Dex Joseph sent at 2/19/2020  1:39 PM CST -----  Contact: Dr Adin Blake @ [phone]  Caller is requesting a return phone call regarding a peer to peer, pls call

## 2020-03-10 DIAGNOSIS — M17.0 PRIMARY OSTEOARTHRITIS OF BOTH KNEES: ICD-10-CM

## 2020-03-10 RX ORDER — HYDROCODONE BITARTRATE AND ACETAMINOPHEN 10; 325 MG/1; MG/1
1 TABLET ORAL 3 TIMES DAILY PRN
Qty: 90 TABLET | Refills: 0 | Status: SHIPPED | OUTPATIENT
Start: 2020-03-11 | End: 2020-04-13 | Stop reason: SDUPTHER

## 2020-03-10 NOTE — TELEPHONE ENCOUNTER
Last Rx refill-----02/10/20  Last office visit--11/20/19  Next office visit--03/27/20      ----- Message from Bibi Danielle MA sent at 3/10/2020  9:37 AM CDT -----  Refill request    HYDROcodone-acetaminophen (NORCO)  mg per tablet     ..  [facility] #[id number] - [place] [address] [facility] & [place] [address] [facility] [address]  Phone: [phone] Fax: [phone]

## 2020-03-10 NOTE — TELEPHONE ENCOUNTER
----- Message from Kenton Christianson sent at 3/10/2020  4:42 PM CDT -----  Contact: Pt  Patient called requesting to have her Rx HYDROcodone-acetaminophen (NORCO)  mg per tablet refilled     [id number] [place] [address] [facility] & [place] [address] [facility] [address]  Phone: [phone] Fax: [phone]

## 2020-03-18 ENCOUNTER — TELEPHONE (OUTPATIENT)
Dept: NEPHROLOGY | Facility: CLINIC | Age: 65
End: 2020-03-18

## 2020-03-30 ENCOUNTER — TELEPHONE (OUTPATIENT)
Dept: DERMATOLOGY | Facility: CLINIC | Age: 65
End: 2020-03-30

## 2020-03-30 NOTE — TELEPHONE ENCOUNTER
Spoke with pt re overdue suture removal appt. She states she cancelled appt on 2/11/20 and never did reschedule. She states sutures are still in place and she is doing fine. Emphasized there importance of having them out in a timely manner, and she agreed to come in for appt offered for tomorrow. Verb thanks.

## 2020-03-31 ENCOUNTER — CLINICAL SUPPORT (OUTPATIENT)
Dept: DERMATOLOGY | Facility: CLINIC | Age: 65
End: 2020-03-31
Payer: MEDICARE

## 2020-03-31 DIAGNOSIS — Z48.02 VISIT FOR SUTURE REMOVAL: Primary | ICD-10-CM

## 2020-03-31 PROCEDURE — 99024 PR POST-OP FOLLOW-UP VISIT: ICD-10-PCS | Mod: S$GLB,,, | Performed by: DERMATOLOGY

## 2020-03-31 PROCEDURE — 99024 POSTOP FOLLOW-UP VISIT: CPT | Mod: S$GLB,,, | Performed by: DERMATOLOGY

## 2020-03-31 PROCEDURE — 99999 PR PBB SHADOW E&M-EST. PATIENT-LVL III: CPT | Mod: PBBFAC,,,

## 2020-03-31 PROCEDURE — 99999 PR PBB SHADOW E&M-EST. PATIENT-LVL III: ICD-10-PCS | Mod: PBBFAC,,,

## 2020-03-31 NOTE — PROGRESS NOTES
Suture Removal note:  CC: 64 y.o. female patient is here for suture removal.         HPI: Patient is s/p excision of rash from the left mid back on 01/28/2020.  Patient reports no problems.    WOUND PE:  Sutures intact.  Wound healing well.  Good approximation of skin edges.  No signs or symptoms of infection.    IMPRESSION:  Skin, left back, biopsy:  - Periadexnal fibrosis with mild interstitial lymphocytic inflammation, see note  Note: The findings are non-specific conisder re-biopsy if clinically indicated. - margins clear.    PLAN:  Sutures removed today.  Continue wound care.    RTC: prn.

## 2020-04-01 ENCOUNTER — TELEPHONE (OUTPATIENT)
Dept: DERMATOLOGY | Facility: CLINIC | Age: 65
End: 2020-04-01

## 2020-04-01 NOTE — TELEPHONE ENCOUNTER
Spoke with pt. Advised her that Dr. Dunn would like to see her in the clinic to follow up on her status and discuss the biopsy results. Appt scheduled for 05/11/2020 at 3:40 PM. Pt states she is doing fine and will keep her appt.

## 2020-04-09 ENCOUNTER — TELEPHONE (OUTPATIENT)
Dept: PHYSICAL MEDICINE AND REHAB | Facility: CLINIC | Age: 65
End: 2020-04-09

## 2020-04-13 DIAGNOSIS — M17.0 PRIMARY OSTEOARTHRITIS OF BOTH KNEES: ICD-10-CM

## 2020-04-13 RX ORDER — HYDROCODONE BITARTRATE AND ACETAMINOPHEN 10; 325 MG/1; MG/1
1 TABLET ORAL 3 TIMES DAILY PRN
Qty: 90 TABLET | Refills: 0 | Status: SHIPPED | OUTPATIENT
Start: 2020-04-13 | End: 2020-05-11 | Stop reason: SDUPTHER

## 2020-04-13 NOTE — TELEPHONE ENCOUNTER
Last Rx refill-----03/10/20  Last office visit--11/20/19  Next office visit--08/03/20        ----- Message from Tiffanie Briones sent at 4/13/2020  8:31 AM CDT -----  Rx Refill/Request     Is this a Refill or New Rx:  Refill    Rx Name and Strength:  HYDROcodone-acetaminophen (NORCO)  mg per tablet    Preferred Pharmacy with phone number:     [facility] #[id number] [place] [address] [address] [place] [address] [facility] [address]  Phone: [phone] Fax: [phone]    Communication Preference:pt @ [phone]  Additional Information:

## 2020-04-14 ENCOUNTER — TELEPHONE (OUTPATIENT)
Dept: DERMATOLOGY | Facility: CLINIC | Age: 65
End: 2020-04-14

## 2020-04-14 NOTE — TELEPHONE ENCOUNTER
----- Message from Ina Dunn MD sent at 4/1/2020  9:00 AM CDT -----  Its more complicated than a simple phone call, but It may just be scar tissue. Is she doing okay? Maybe we could schedule follow up in May. I am thinking about coming back sooner than scheduled (Maybe May 1 or week before). If we open this let's schedule her.   ----- Message -----  From: Raya Sifuentes LPN  Sent: 3/31/2020  10:07 AM CDT  To: Ina Dunn MD    Hi Dr. Dunn!  I just wanted you to know that I saw this patient today for a suture removal. Going through the biopsy book yesterday to update the findings, it became clear that this pt never did come in for her suture removal. She had cancelled her S/R appt sched for 02/11/2020 and did not f/u since.  I called her and arranged an appt for today. She was doing well, and the sutures were removed without a problem. The site looked good. Vaseline and band-aid applied.   I did not go over her results, but I told her it was nothing to worry about, and that I would reach out to you to update you on her visit. Have a wonderful day!

## 2020-05-11 DIAGNOSIS — M17.0 PRIMARY OSTEOARTHRITIS OF BOTH KNEES: ICD-10-CM

## 2020-05-11 RX ORDER — HYDROCODONE BITARTRATE AND ACETAMINOPHEN 10; 325 MG/1; MG/1
1 TABLET ORAL 3 TIMES DAILY PRN
Qty: 90 TABLET | Refills: 0 | Status: SHIPPED | OUTPATIENT
Start: 2020-05-13 | End: 2020-06-12 | Stop reason: SDUPTHER

## 2020-05-11 NOTE — TELEPHONE ENCOUNTER
Last Rx refill-----04/13/20  Last office visit--11/20/19  Next office visit--08/03/20          ----- Message from Sommer Max sent at 5/11/2020  9:04 AM CDT -----  Contact: PT  Refill Request  Refill Request    HYDROcodone-acetaminophen (NORCO)  mg per tablet  Take 1 tablet by mouth 3 (three) times daily as needed. - Oral  90 tablet  Notes to Pharmacy: Medically necessary for more than 7 days due to chronic pain.    [id number] - [place] [address] [facility] & [place] [address] [facility] [address]  Phone: [phone] Fax: [phone]    Callback: [phone]

## 2020-06-08 ENCOUNTER — TELEPHONE (OUTPATIENT)
Dept: PHYSICAL MEDICINE AND REHAB | Facility: CLINIC | Age: 65
End: 2020-06-08

## 2020-06-08 NOTE — TELEPHONE ENCOUNTER
Called and spoke with patient and Chrissy.  Patient was informed that she was denied by her insurance company [employer].  Patient states she has two other insurance other then [employer] she can use .  I informed her that this request was done 11/20/19 and her most recent insurance information was used at that time.  Patient asked for  [employer] number so she can speak with them as well..  I was able to give her there office number.        ----- Message from Urszula Ruelas sent at 6/8/2020  3:27 PM CDT -----  Contact: self @ [phone]  Pt says she is requesting to speak with Dr Gallo because she does not have her scooter yet.  Pls call.

## 2020-06-12 DIAGNOSIS — M17.0 PRIMARY OSTEOARTHRITIS OF BOTH KNEES: ICD-10-CM

## 2020-06-12 RX ORDER — HYDROCODONE BITARTRATE AND ACETAMINOPHEN 10; 325 MG/1; MG/1
1 TABLET ORAL 3 TIMES DAILY PRN
Qty: 90 TABLET | Refills: 0 | Status: SHIPPED | OUTPATIENT
Start: 2020-06-13 | End: 2020-07-13 | Stop reason: SDUPTHER

## 2020-06-12 NOTE — TELEPHONE ENCOUNTER
Last Rx refill-----05/11/20  Last office visit--11/20/19  Next office visit--08/06/20          ----- Message from Emeli Braden sent at 6/12/2020  8:25 AM CDT -----  Contact: Urszula Garcia out of her Hydrocodone and needs to have it refilled.  Please call in to the [facility] at :    [facility]     [facility] #[id number] - [place] [address] [address] [facility] & [place] [address] [facility] [address]  Phone: [phone] Fax: [phone]

## 2020-07-13 DIAGNOSIS — M17.0 PRIMARY OSTEOARTHRITIS OF BOTH KNEES: ICD-10-CM

## 2020-07-13 RX ORDER — HYDROCODONE BITARTRATE AND ACETAMINOPHEN 10; 325 MG/1; MG/1
1 TABLET ORAL 3 TIMES DAILY PRN
Qty: 90 TABLET | Refills: 0 | Status: SHIPPED | OUTPATIENT
Start: 2020-07-14 | End: 2020-08-13 | Stop reason: SDUPTHER

## 2020-07-13 NOTE — TELEPHONE ENCOUNTER
Duplicate message      ----- Message from Dex Joseph sent at 7/13/2020 12:38 PM CDT -----  Contact: Patient @ [phone]  Rx Refill/Request     Is this a Refill or New Rx:  Yes   Rx Name and Strength:  HYDROcodone-acetaminophen (NORCO)  mg per tablet  Preferred Pharmacy with phone number:     [facility] #[id number] [place] [address] [facility] & [place] [address] [facility] [address]  Phone: [phone] Fax: [phone]

## 2020-07-13 NOTE — TELEPHONE ENCOUNTER
Last Rx refill-----06/12/20  Last office visit--11/20/19  Next office visit--08/03/20        ----- Message from Urszula Ruelas sent at 7/13/2020  8:34 AM CDT -----  Contact: self @ [phone]  Pt is req a refill for hydrocodone 10/325.        [facility] #[id number] - [place] [address] [phone] (Phone)      [phone] (Fax)

## 2020-07-20 ENCOUNTER — TELEPHONE (OUTPATIENT)
Dept: OPTOMETRY | Facility: CLINIC | Age: 65
End: 2020-07-20

## 2020-07-20 ENCOUNTER — TELEPHONE (OUTPATIENT)
Dept: DERMATOLOGY | Facility: CLINIC | Age: 65
End: 2020-07-20

## 2020-07-20 NOTE — TELEPHONE ENCOUNTER
----- Message from Tiffanie Briones sent at 7/20/2020 12:21 PM CDT -----  Regarding: pt advice  Contact: pt @ [phone]  Calling to speak with someone in Dr. Dunn's office regarding her pain. Please call.

## 2020-07-20 NOTE — TELEPHONE ENCOUNTER
Lm for patient: let her know that it was ok for son to come with her for appointment ----- Message from Vonda Trevizo sent at 7/20/2020  2:20 PM CDT -----  Regarding: FW: pt needs assistance coming in clinic    ----- Message -----  From: Puja Barker  Sent: 7/20/2020   1:29 PM CDT  To: Joesph Loyola Staff  Subject: pt needs assistance coming in clinic             Pt want to know if her son is allowed to come in the clinic with her because she need help getting in there. Pt can be reached at [phone].

## 2020-07-22 ENCOUNTER — OFFICE VISIT (OUTPATIENT)
Dept: OPTOMETRY | Facility: CLINIC | Age: 65
End: 2020-07-22
Payer: MEDICARE

## 2020-07-22 ENCOUNTER — TELEPHONE (OUTPATIENT)
Dept: DERMATOLOGY | Facility: CLINIC | Age: 65
End: 2020-07-22

## 2020-07-22 DIAGNOSIS — H53.19 VITREOUS FLASHES OF BOTH EYES: ICD-10-CM

## 2020-07-22 DIAGNOSIS — H43.393 VISUAL FLOATERS, BILATERAL: Primary | ICD-10-CM

## 2020-07-22 DIAGNOSIS — H53.9 VISUAL DISTURBANCES: ICD-10-CM

## 2020-07-22 DIAGNOSIS — E11.9 TYPE 2 DIABETES MELLITUS WITHOUT RETINOPATHY: ICD-10-CM

## 2020-07-22 DIAGNOSIS — E11.36 CONTROLLED TYPE 2 DIABETES MELLITUS WITH CATARACT: ICD-10-CM

## 2020-07-22 PROCEDURE — 92004 PR EYE EXAM, NEW PATIENT,COMPREHESV: ICD-10-PCS | Mod: S$GLB,,, | Performed by: OPTOMETRIST

## 2020-07-22 PROCEDURE — 99999 PR PBB SHADOW E&M-EST. PATIENT-LVL III: CPT | Mod: PBBFAC,,, | Performed by: OPTOMETRIST

## 2020-07-22 PROCEDURE — 92004 COMPRE OPH EXAM NEW PT 1/>: CPT | Mod: S$GLB,,, | Performed by: OPTOMETRIST

## 2020-07-22 PROCEDURE — 99999 PR PBB SHADOW E&M-EST. PATIENT-LVL III: ICD-10-PCS | Mod: PBBFAC,,, | Performed by: OPTOMETRIST

## 2020-07-22 NOTE — TELEPHONE ENCOUNTER
----- Message from Emeli Braden sent at 7/22/2020  9:27 AM CDT -----  Regarding: Office notes  Contact: Ina  Pt calling to find out if she can get her office notes from her visits with Dermatology.  She is in Ophthalmology now and wants to know if she can come up and get her information.  Please contact her at [phone]

## 2020-07-22 NOTE — PROGRESS NOTES
"HPI     Pt is coming in for new onset flashes and floaters OS   Pt states that she feels like "clothes move and things make faces like on   a towel"  Describes as flashes of lightning that move, black pimple in middle of the   vision that moves when she moves , and rainbow  Sees flashes when she turns to the left side   Symptoms started last week and has been getting worse   Denies changes in va         Last edited by Nir Pink, OD on 7/22/2020 10:00 AM. (History)            Assessment /Plan     For exam results, see Encounter Report.    Visual floaters, bilateral    Vitreous flashes of both eyes    Visual disturbances    Type 2 diabetes mellitus without retinopathy    Controlled type 2 diabetes mellitus with cataract    1-2. No e/o h/b/t 360 degrees OU. Monitor for worsening of symptoms or S/Sx of RD. RTc 1 mo Routine  3. Pt describes that she sees clothes move and faces on towels. No ocular pathology found in association.   4. BS control. No signs of diabetic retinopathy. Monitor with annual exam.  5. Nuclear sclerotic cataract - not visually significant. Observe.                 "

## 2020-08-13 DIAGNOSIS — M17.0 PRIMARY OSTEOARTHRITIS OF BOTH KNEES: ICD-10-CM

## 2020-08-13 RX ORDER — HYDROCODONE BITARTRATE AND ACETAMINOPHEN 10; 325 MG/1; MG/1
1 TABLET ORAL 3 TIMES DAILY PRN
Qty: 90 TABLET | Refills: 0 | Status: SHIPPED | OUTPATIENT
Start: 2020-08-13 | End: 2020-09-11 | Stop reason: SDUPTHER

## 2020-08-13 NOTE — TELEPHONE ENCOUNTER
Last Rx refill-----07/13/20  Last office visit--11/20/19  Next office visit--pt no show 08/03/20 appt        ----- Message from Devonte Lakhani sent at 8/13/2020  8:28 AM CDT -----  Rx Refill/Request     Is this a Refill or New Rx: Refill   Rx Name and Strength: HYDROcodone-acetaminophen (NORCO)  mg per tablet   Preferred Pharmacy with phone number: see below  Communication Preference: [phone]   Additional Information:     [facility] #[id number] - [place] [address] [facility] & [place] [address] [facility] [address]  Phone: [phone] Fax: [phone]

## 2020-09-03 ENCOUNTER — TELEPHONE (OUTPATIENT)
Dept: PHYSICAL MEDICINE AND REHAB | Facility: CLINIC | Age: 65
End: 2020-09-03

## 2020-09-03 ENCOUNTER — OFFICE VISIT (OUTPATIENT)
Dept: DERMATOLOGY | Facility: CLINIC | Age: 65
End: 2020-09-03
Payer: MEDICARE

## 2020-09-03 DIAGNOSIS — L28.1 PICKER'S NODULE: Primary | ICD-10-CM

## 2020-09-03 DIAGNOSIS — L29.9 PRURITUS: ICD-10-CM

## 2020-09-03 PROCEDURE — 11900 INJECT SKIN LESIONS </W 7: CPT | Mod: S$GLB,,, | Performed by: DERMATOLOGY

## 2020-09-03 PROCEDURE — 99999 PR PBB SHADOW E&M-EST. PATIENT-LVL III: ICD-10-PCS | Mod: PBBFAC,,, | Performed by: DERMATOLOGY

## 2020-09-03 PROCEDURE — 99213 PR OFFICE/OUTPT VISIT, EST, LEVL III, 20-29 MIN: ICD-10-PCS | Mod: 25,S$GLB,, | Performed by: DERMATOLOGY

## 2020-09-03 PROCEDURE — 99213 OFFICE O/P EST LOW 20 MIN: CPT | Mod: 25,S$GLB,, | Performed by: DERMATOLOGY

## 2020-09-03 PROCEDURE — 99999 PR PBB SHADOW E&M-EST. PATIENT-LVL III: CPT | Mod: PBBFAC,,, | Performed by: DERMATOLOGY

## 2020-09-03 PROCEDURE — 11900 PR INJECTION INTO SKIN LESIONS, UP TO 7: ICD-10-PCS | Mod: S$GLB,,, | Performed by: DERMATOLOGY

## 2020-09-03 RX ORDER — MOMETASONE FUROATE 1 MG/G
OINTMENT TOPICAL DAILY
Qty: 45 G | Refills: 3 | Status: SHIPPED | OUTPATIENT
Start: 2020-09-03

## 2020-09-03 NOTE — PATIENT INSTRUCTIONS
Start dove senstive skin or eczema body wash by cerave  Start cerave cream or eczema creamy oil  q pm-- after bathing and patting dry skin, apply two times a day at least if not 3x a day  Change to free and clear detergent (all, tide, purex)  Use warm water (no hot water)  If there are red inflammed areas apply steroid ointment at night  - Use cerave anti- itch cream as often a needed put in fridge. Ice will help with itch as well.

## 2020-09-03 NOTE — TELEPHONE ENCOUNTER
Patient came to the lobby today and dropped off  paperwork asking to be signed for her  Electric Scooter.    After reviewing the paperwork I was able to contact the patient @ [phone].  I informed the patient that I was able to contact her on 06/08/20 to inform her that her insurance copany denied her for her chair.  Patient said she didn't remembered.  SEE Chart NOTE FOR 06/08/20.    Patient was informed that her paperwork was for [employer], food stamp assistance program that has to be filled out by her and her PCP.  Patient was informed to come back to the office to pick it up at the ,  Patient asked for me to contact  her PCP and let them know about the paperwork and then mail her the form, she doesn't want to come back for it.  Patient was informed that I will mail her the form back to her address .  I informed the patient that this is a personal and private matter she has to  discuss with her PCP.     Patient asked to just fax the paperwork back the  Company.    I placed the letter in the mail to send to the patient address.

## 2020-09-03 NOTE — PROGRESS NOTES
Subjective:       Patient ID:  Urszula Yeh is a 64 y.o. female who presents for   Chief Complaint   Patient presents with    Follow-up     itching     Follow-up - Follow-up  Symptom course: unchanged  Affected locations: back  Signs / symptoms: itching  Severity: moderate    Using dove soap, cerave anti-itch from time to time and the mometasone, however new areas and nodules where she has been scratching on right side of her back. The left side has cleared    Review of Systems   Constitutional: Negative for fever, chills and fatigue.   Skin: Negative for daily sunscreen use, recent sunburn and wears hat.   Hematologic/Lymphatic: Bruises/bleeds easily.        Objective:    Physical Exam   Constitutional: She appears well-developed and well-nourished. No distress.   Neurological: She is alert and oriented to person, place, and time. She is not disoriented.   Psychiatric: She has a normal mood and affect.   Skin:   Areas Examined (abnormalities noted in diagram):   Head / Face Inspection Performed  Neck Inspection Performed  Chest / Axilla Inspection Performed  Back Inspection Performed  RUE Inspected  LUE Inspection Performed              Diagram Legend     Erythematous scaling macule/papule c/w actinic keratosis       Vascular papule c/w angioma      Pigmented verrucoid papule/plaque c/w seborrheic keratosis      Yellow umbilicated papule c/w sebaceous hyperplasia      Irregularly shaped tan macule c/w lentigo     1-2 mm smooth white papules consistent with Milia      Movable subcutaneous cyst with punctum c/w epidermal inclusion cyst      Subcutaneous movable cyst c/w pilar cyst      Firm pink to brown papule c/w dermatofibroma      Pedunculated fleshy papule(s) c/w skin tag(s)      Evenly pigmented macule c/w junctional nevus     Mildly variegated pigmented, slightly irregular-bordered macule c/w mildly atypical nevus      Flesh colored to evenly pigmented papule c/w intradermal nevus       Pink pearly  papule/plaque c/w basal cell carcinoma      Erythematous hyperkeratotic cursted plaque c/w SCC      Surgical scar with no sign of skin cancer recurrence      Open and closed comedones      Inflammatory papules and pustules      Verrucoid papule consistent consistent with wart     Erythematous eczematous patches and plaques     Dystrophic onycholytic nail with subungual debris c/w onychomycosis     Umbilicated papule    Erythematous-base heme-crusted tan verrucoid plaque consistent with inflamed seborrheic keratosis     Erythematous Silvery Scaling Plaque c/w Psoriasis     See annotation      Assessment / Plan:        's nodule  Intralesional Kenalog 5mg/cc (1cc total) injected into 6 lesions on the back  today after obtaining verbal consent including risk of surrounding hypopigmentation. Patient tolerated procedure well.    Units: 0.5  NDC for Kenalog 10mg/cc:  0619-6981-41    Pruritus  - mometasone refilled  Start dove senstive skin or eczema body wash by cerave  Start cerave cream or eczema creamy oil  q pm-- after bathing and patting dry skin, apply two times a day at least if not 3x a day  Change to free and clear detergent (all, tide, purex)  Use warm water (no hot water)  If there are red inflammed areas apply steroid ointment at night  - Use cerave anti- itch cream as often a needed put in fridge. Ice will help with itch as well.              No follow-ups on file.

## 2020-09-11 DIAGNOSIS — M17.0 PRIMARY OSTEOARTHRITIS OF BOTH KNEES: ICD-10-CM

## 2020-09-11 RX ORDER — HYDROCODONE BITARTRATE AND ACETAMINOPHEN 10; 325 MG/1; MG/1
1 TABLET ORAL 3 TIMES DAILY PRN
Qty: 90 TABLET | Refills: 0 | Status: SHIPPED | OUTPATIENT
Start: 2020-09-12 | End: 2020-10-14 | Stop reason: SDUPTHER

## 2020-09-11 NOTE — TELEPHONE ENCOUNTER
----- Message from Urszula Ruelas sent at 9/11/2020  8:18 AM CDT -----  Contact: self @ [phone]  Pt is req a refill for hydrocodone 10/325.     [facility] #[id number] [address] AT [address] [phone] (Phone)      [phone] (Fax)

## 2020-10-13 ENCOUNTER — TELEPHONE (OUTPATIENT)
Dept: PHYSICAL MEDICINE AND REHAB | Facility: CLINIC | Age: 65
End: 2020-10-13

## 2020-10-13 NOTE — TELEPHONE ENCOUNTER
----- Message from Pedro Gates sent at 10/13/2020  8:42 AM CDT -----  Contact: self  Pt is asking for a refill on her medication   HYDROcodone-acetaminophen (NORCO)  mg per tablet 90 tablet     [facility] #[id number] [place], [place]     Contact info- [phone]

## 2020-10-14 DIAGNOSIS — M17.0 PRIMARY OSTEOARTHRITIS OF BOTH KNEES: ICD-10-CM

## 2020-10-14 RX ORDER — HYDROCODONE BITARTRATE AND ACETAMINOPHEN 10; 325 MG/1; MG/1
1 TABLET ORAL 3 TIMES DAILY PRN
Qty: 90 TABLET | Refills: 0 | Status: SHIPPED | OUTPATIENT
Start: 2020-10-14 | End: 2020-11-12 | Stop reason: SDUPTHER

## 2020-10-14 NOTE — TELEPHONE ENCOUNTER
Called patient again.  Patient was informed that her message was received and forward to the Doctor.  Patient was ok with this.  Patient was againinformed  to submit her refill request 3-4 day prior to the due date.

## 2020-10-14 NOTE — TELEPHONE ENCOUNTER
----- Message from Nubia Adamson sent at 10/14/2020 11:31 AM CDT -----  Contact: pt  Please call pt at [phone]    Patient is calling about the status of her medication refill     Patient is still at the pharmacy     Thank you

## 2020-10-14 NOTE — TELEPHONE ENCOUNTER
----- Message from Gloria Davila sent at 10/14/2020 11:27 AM CDT -----        Patient is requesting refill of Hydrocodone sent to the [facility]     Patient can be reached @# [phone]

## 2020-10-14 NOTE — TELEPHONE ENCOUNTER
----- Message from Nubia Adamson sent at 10/14/2020 10:43 AM CDT -----  Contact: pt  Please call pt at [phone]    Refill request for HYDROcodone-acetaminophen (NORCO)      Use [facility] #[id number] [place] [address] AT [address] [phone] (Phone)  [phone] (Fax)    Patient is currently waiting at the pharmacy         Thank you

## 2020-11-11 ENCOUNTER — HOSPITAL ENCOUNTER (EMERGENCY)
Facility: OTHER | Age: 65
Discharge: HOME | End: 2020-11-11
Attending: EMERGENCY MEDICINE
Payer: MEDICARE

## 2020-11-11 VITALS
RESPIRATION RATE: 17 BRPM | OXYGEN SATURATION: 97 % | TEMPERATURE: 98 F | HEART RATE: 70 BPM | SYSTOLIC BLOOD PRESSURE: 157 MMHG | DIASTOLIC BLOOD PRESSURE: 85 MMHG

## 2020-11-11 DIAGNOSIS — R07.9 CHEST PAIN: ICD-10-CM

## 2020-11-11 DIAGNOSIS — R60.9 SWELLING: ICD-10-CM

## 2020-11-11 LAB
ALBUMIN SERPL BCP-MCNC: 3.1 G/DL (ref 3.5–5.2)
ALP SERPL-CCNC: 69 U/L (ref 55–135)
ALT SERPL W/O P-5'-P-CCNC: 14 U/L (ref 10–44)
ANION GAP SERPL CALC-SCNC: 9 MMOL/L (ref 8–16)
AST SERPL-CCNC: 11 U/L (ref 10–40)
BACTERIA #/AREA URNS HPF: ABNORMAL /HPF
BASOPHILS # BLD AUTO: 0.07 K/UL (ref 0–0.2)
BASOPHILS NFR BLD: 0.7 % (ref 0–1.9)
BILIRUB SERPL-MCNC: 0.5 MG/DL (ref 0.1–1)
BILIRUB UR QL STRIP: ABNORMAL
BUN SERPL-MCNC: 23 MG/DL (ref 8–23)
CALCIUM SERPL-MCNC: 9.3 MG/DL (ref 8.7–10.5)
CHLORIDE SERPL-SCNC: 106 MMOL/L (ref 95–110)
CLARITY UR: CLEAR
CO2 SERPL-SCNC: 24 MMOL/L (ref 23–29)
COLOR UR: YELLOW
CREAT SERPL-MCNC: 1.4 MG/DL (ref 0.5–1.4)
DIFFERENTIAL METHOD: ABNORMAL
EOSINOPHIL # BLD AUTO: 0.3 K/UL (ref 0–0.5)
EOSINOPHIL NFR BLD: 2.6 % (ref 0–8)
ERYTHROCYTE [DISTWIDTH] IN BLOOD BY AUTOMATED COUNT: 13.5 % (ref 11.5–14.5)
EST. GFR  (AFRICAN AMERICAN): 45 ML/MIN/1.73 M^2
EST. GFR  (NON AFRICAN AMERICAN): 39 ML/MIN/1.73 M^2
GLUCOSE SERPL-MCNC: 360 MG/DL (ref 70–110)
GLUCOSE UR QL STRIP: ABNORMAL
HCT VFR BLD AUTO: 44.4 % (ref 37–48.5)
HGB BLD-MCNC: 14.2 G/DL (ref 12–16)
HGB UR QL STRIP: ABNORMAL
HYALINE CASTS #/AREA URNS LPF: 80 /LPF
IMM GRANULOCYTES # BLD AUTO: 0.03 K/UL (ref 0–0.04)
IMM GRANULOCYTES NFR BLD AUTO: 0.3 % (ref 0–0.5)
KETONES UR QL STRIP: ABNORMAL
LEUKOCYTE ESTERASE UR QL STRIP: NEGATIVE
LIPASE SERPL-CCNC: 103 U/L (ref 4–60)
LYMPHOCYTES # BLD AUTO: 3.3 K/UL (ref 1–4.8)
LYMPHOCYTES NFR BLD: 33.9 % (ref 18–48)
MCH RBC QN AUTO: 26.5 PG (ref 27–31)
MCHC RBC AUTO-ENTMCNC: 32 G/DL (ref 32–36)
MCV RBC AUTO: 83 FL (ref 82–98)
MICROSCOPIC COMMENT: ABNORMAL
MONOCYTES # BLD AUTO: 0.9 K/UL (ref 0.3–1)
MONOCYTES NFR BLD: 9.4 % (ref 4–15)
NEUTROPHILS # BLD AUTO: 5.2 K/UL (ref 1.8–7.7)
NEUTROPHILS NFR BLD: 53.1 % (ref 38–73)
NITRITE UR QL STRIP: NEGATIVE
NRBC BLD-RTO: 0 /100 WBC
PH UR STRIP: 6 [PH] (ref 5–8)
PLATELET # BLD AUTO: 217 K/UL (ref 150–350)
PMV BLD AUTO: 9.8 FL (ref 9.2–12.9)
POTASSIUM SERPL-SCNC: 4 MMOL/L (ref 3.5–5.1)
PROT SERPL-MCNC: 7.1 G/DL (ref 6–8.4)
PROT UR QL STRIP: ABNORMAL
RBC # BLD AUTO: 5.36 M/UL (ref 4–5.4)
RBC #/AREA URNS HPF: 10 /HPF (ref 0–4)
SODIUM SERPL-SCNC: 139 MMOL/L (ref 136–145)
SP GR UR STRIP: >=1.03 (ref 1–1.03)
SQUAMOUS #/AREA URNS HPF: 22 /HPF
TROPONIN I SERPL DL<=0.01 NG/ML-MCNC: <0.006 NG/ML (ref 0–0.03)
URN SPEC COLLECT METH UR: ABNORMAL
UROBILINOGEN UR STRIP-ACNC: NEGATIVE EU/DL
WBC # BLD AUTO: 9.86 K/UL (ref 3.9–12.7)
WBC #/AREA URNS HPF: 8 /HPF (ref 0–5)
YEAST URNS QL MICRO: ABNORMAL

## 2020-11-11 PROCEDURE — 93010 ELECTROCARDIOGRAM REPORT: CPT | Mod: ,,, | Performed by: INTERNAL MEDICINE

## 2020-11-11 PROCEDURE — 81000 URINALYSIS NONAUTO W/SCOPE: CPT

## 2020-11-11 PROCEDURE — 83690 ASSAY OF LIPASE: CPT

## 2020-11-11 PROCEDURE — 84484 ASSAY OF TROPONIN QUANT: CPT

## 2020-11-11 PROCEDURE — 80053 COMPREHEN METABOLIC PANEL: CPT

## 2020-11-11 PROCEDURE — 99285 EMERGENCY DEPT VISIT HI MDM: CPT | Mod: 25

## 2020-11-11 PROCEDURE — 36415 COLL VENOUS BLD VENIPUNCTURE: CPT

## 2020-11-11 PROCEDURE — 93010 EKG 12-LEAD: ICD-10-PCS | Mod: ,,, | Performed by: INTERNAL MEDICINE

## 2020-11-11 PROCEDURE — 93005 ELECTROCARDIOGRAM TRACING: CPT

## 2020-11-11 PROCEDURE — 85025 COMPLETE CBC W/AUTO DIFF WBC: CPT

## 2020-11-11 NOTE — FIRST PROVIDER EVALUATION
Emergency Department TeleTriage Encounter Note      CHIEF COMPLAINT    Chief Complaint   Patient presents with    Abdominal Pain     +intermittent RUQ pain x1 week. pt deneis fever, chills,n/v/d, sob, cp     Leg Swelling     +right leg swellingx1 week. pt reports having metal clarence in leg.        VITAL SIGNS   Initial Vitals [11/11/20 1403]   BP Pulse Resp Temp SpO2   (!) 142/72 80 18 97.8 °F (36.6 °C) 95 %      MAP       --            ALLERGIES    Review of patient's allergies indicates:   Allergen Reactions    Penicillins Swelling       PROVIDER TRIAGE NOTE  This is a teletriage evaluation of a 64 y.o. female presenting to the ED with c/o upper abdominal pain for the past week.  Pt denies ever having similar pain.  Pt also endorses right lower extremity swelling.  .     Initial orders will be placed and care will be transferred to an alternate provider when patient is roomed for a full evaluation. Any additional orders and the final disposition will be determined by that provider.         ORDERS  Labs Reviewed - No data to display    ED Orders (720h ago, onward)    Start Ordered     Status Ordering Provider    11/11/20 1412 11/11/20 1411  Vital signs  Every 2 hours      Ordered VASQUEZ MILLER    11/11/20 1411 11/11/20 1411  Diet NPO  Diet effective now      Ordered VASQUEZ MILLER    11/11/20 1411 11/11/20 1411  Insert peripheral IV  Once      Ordered VASQUEZ MILLER    11/11/20 1411 11/11/20 1411  CBC W/ AUTO DIFFERENTIAL  STAT      Ordered VASQUEZ MILLER    11/11/20 1411 11/11/20 1411  Comp. Metabolic Panel  STAT      Ordered VASQUEZ MILLER    11/11/20 1411 11/11/20 1411  Lipase  STAT      Ordered VASQUEZ MILLER    11/11/20 1411 11/11/20 1411  Urinalysis, Reflex to Urine Culture Urine, Clean Catch  STAT      Ordered VASQUEZ MILLER    11/11/20 1411 11/11/20 1411  EKG 12-lead  Once      Ordered VASQUEZ MILLER            Virtual Visit Note: The provider triage portion of this emergency department  evaluation and documentation was performed via [email or web address], a HIPAA-compliant telemedicine application, in concert with a tele-presenter in the room. A face to face patient evaluation with one of my colleagues will occur once the patient is placed in an emergency department room.      DISCLAIMER: This note was prepared with [employer] voice recognition transcription software. Garbled syntax, mangled pronouns, and other bizarre constructions may be attributed to that software system.

## 2020-11-11 NOTE — ED NOTES
Pt to ED with c/o RUQ abdominal pain x 1 week. Pt reports R chest pain intermittent x 1 week. Pt reports BLE swelling. +1 edema noted to R leg. Pt reports having clarence placed in leg. RUQ tenderness with palpation. Pt denies SOB, n/v/d, fever, chills. AAOX4. RR even, unlabored. NAD noted. Pt attached to cardiac monitor, pulse ox and BP cycled. Will continue to monitor.
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
## 2020-11-12 DIAGNOSIS — M17.0 PRIMARY OSTEOARTHRITIS OF BOTH KNEES: ICD-10-CM

## 2020-11-12 RX ORDER — HYDROCODONE BITARTRATE AND ACETAMINOPHEN 10; 325 MG/1; MG/1
1 TABLET ORAL 3 TIMES DAILY PRN
Qty: 90 TABLET | Refills: 0 | Status: SHIPPED | OUTPATIENT
Start: 2020-11-14 | End: 2020-12-11 | Stop reason: SDUPTHER

## 2020-11-12 NOTE — TELEPHONE ENCOUNTER
----- Message from Emeli Braden sent at 11/12/2020  9:00 AM CST -----  Regarding: Refills  Rx Refill/Request     Is this a Refill or New Rx:  Refill    Rx Name and Strength:  HYDROcodone-acetaminophen (NORCO)  mg per tablet    Preferred Pharmacy with phone number: [facility] #[id number] [place] [address] [facility] [place] [address] [facility] [address]  Phone: [phone] Fax: [phone]      Communication Preference:  Additional Information:

## 2020-12-01 NOTE — TELEPHONE ENCOUNTER
06/12/18 last Rx refill  03/27/17 last office visit          ----- Message from Tiffanie Briones sent at 7/13/2018  8:04 AM CDT -----  Rx Refill/Request     Is this a Refill or New Rx: refill   Rx Name and Strength:  HYDROcodone-acetaminophen (NORCO)  mg per tablet  Preferred Pharm[facility] with phone number:     [facility] [address] [place] [address] [facility] [address] [place] [address] [facility] [address]  Phone: [phone] Fax: [phone]      Communication Preference:phone# [phone]  Additional Information: asking to have done this morning before 12 noon       [facility]

## 2020-12-11 DIAGNOSIS — M17.0 PRIMARY OSTEOARTHRITIS OF BOTH KNEES: ICD-10-CM

## 2020-12-11 RX ORDER — HYDROCODONE BITARTRATE AND ACETAMINOPHEN 10; 325 MG/1; MG/1
1 TABLET ORAL 3 TIMES DAILY PRN
Qty: 90 TABLET | Refills: 0 | Status: SHIPPED | OUTPATIENT
Start: 2020-12-14 | End: 2021-01-13 | Stop reason: SDUPTHER

## 2020-12-11 NOTE — TELEPHONE ENCOUNTER
----- Message from Christopher Victoria sent at 12/11/2020  8:05 AM CST -----  Contact: self  Pt calling for refill  HYDROcodone-acetaminophen (NORCO)  mg per tablet    Contact

## 2021-01-07 ENCOUNTER — TELEPHONE (OUTPATIENT)
Dept: PHYSICAL MEDICINE AND REHAB | Facility: CLINIC | Age: 66
End: 2021-01-07

## 2021-01-13 DIAGNOSIS — M17.0 PRIMARY OSTEOARTHRITIS OF BOTH KNEES: ICD-10-CM

## 2021-01-13 RX ORDER — HYDROCODONE BITARTRATE AND ACETAMINOPHEN 10; 325 MG/1; MG/1
1 TABLET ORAL 3 TIMES DAILY PRN
Qty: 90 TABLET | Refills: 0 | Status: SHIPPED | OUTPATIENT
Start: 2021-01-14 | End: 2021-02-11 | Stop reason: SDUPTHER

## 2021-01-29 ENCOUNTER — OFFICE VISIT (OUTPATIENT)
Dept: PHYSICAL MEDICINE AND REHAB | Facility: CLINIC | Age: 66
End: 2021-01-29
Payer: MEDICARE

## 2021-01-29 VITALS
DIASTOLIC BLOOD PRESSURE: 86 MMHG | SYSTOLIC BLOOD PRESSURE: 171 MMHG | BODY MASS INDEX: 24.22 KG/M2 | WEIGHT: 154.31 LBS | HEIGHT: 67 IN | HEART RATE: 78 BPM

## 2021-01-29 DIAGNOSIS — G89.29 CHRONIC BILATERAL LOW BACK PAIN WITHOUT SCIATICA: ICD-10-CM

## 2021-01-29 DIAGNOSIS — G89.29 CHRONIC NECK PAIN: ICD-10-CM

## 2021-01-29 DIAGNOSIS — M17.0 PRIMARY OSTEOARTHRITIS OF BOTH KNEES: ICD-10-CM

## 2021-01-29 DIAGNOSIS — E11.42 DIABETIC PERIPHERAL NEUROPATHY: ICD-10-CM

## 2021-01-29 DIAGNOSIS — R53.81 DEBILITY: ICD-10-CM

## 2021-01-29 DIAGNOSIS — M54.50 CHRONIC BILATERAL LOW BACK PAIN WITHOUT SCIATICA: ICD-10-CM

## 2021-01-29 DIAGNOSIS — R26.9 GAIT DISORDER: Primary | ICD-10-CM

## 2021-01-29 DIAGNOSIS — M54.2 CHRONIC NECK PAIN: ICD-10-CM

## 2021-01-29 DIAGNOSIS — R29.6 FREQUENT FALLS: ICD-10-CM

## 2021-01-29 PROCEDURE — 99999 PR PBB SHADOW E&M-EST. PATIENT-LVL II: ICD-10-PCS | Mod: PBBFAC,,, | Performed by: PHYSICAL MEDICINE & REHABILITATION

## 2021-01-29 PROCEDURE — 99214 OFFICE O/P EST MOD 30 MIN: CPT | Mod: S$GLB,,, | Performed by: PHYSICAL MEDICINE & REHABILITATION

## 2021-01-29 PROCEDURE — 3077F PR MOST RECENT SYSTOLIC BLOOD PRESSURE >= 140 MM HG: ICD-10-PCS | Mod: CPTII,S$GLB,, | Performed by: PHYSICAL MEDICINE & REHABILITATION

## 2021-01-29 PROCEDURE — 3008F PR BODY MASS INDEX (BMI) DOCUMENTED: ICD-10-PCS | Mod: CPTII,S$GLB,, | Performed by: PHYSICAL MEDICINE & REHABILITATION

## 2021-01-29 PROCEDURE — 3008F BODY MASS INDEX DOCD: CPT | Mod: CPTII,S$GLB,, | Performed by: PHYSICAL MEDICINE & REHABILITATION

## 2021-01-29 PROCEDURE — 1125F PR PAIN SEVERITY QUANTIFIED, PAIN PRESENT: ICD-10-PCS | Mod: S$GLB,,, | Performed by: PHYSICAL MEDICINE & REHABILITATION

## 2021-01-29 PROCEDURE — 1159F PR MEDICATION LIST DOCUMENTED IN MEDICAL RECORD: ICD-10-PCS | Mod: S$GLB,,, | Performed by: PHYSICAL MEDICINE & REHABILITATION

## 2021-01-29 PROCEDURE — 1159F MED LIST DOCD IN RCRD: CPT | Mod: S$GLB,,, | Performed by: PHYSICAL MEDICINE & REHABILITATION

## 2021-01-29 PROCEDURE — 3077F SYST BP >= 140 MM HG: CPT | Mod: CPTII,S$GLB,, | Performed by: PHYSICAL MEDICINE & REHABILITATION

## 2021-01-29 PROCEDURE — 99999 PR PBB SHADOW E&M-EST. PATIENT-LVL II: CPT | Mod: PBBFAC,,, | Performed by: PHYSICAL MEDICINE & REHABILITATION

## 2021-01-29 PROCEDURE — 3072F LOW RISK FOR RETINOPATHY: CPT | Mod: S$GLB,,, | Performed by: PHYSICAL MEDICINE & REHABILITATION

## 2021-01-29 PROCEDURE — 99214 PR OFFICE/OUTPT VISIT, EST, LEVL IV, 30-39 MIN: ICD-10-PCS | Mod: S$GLB,,, | Performed by: PHYSICAL MEDICINE & REHABILITATION

## 2021-01-29 PROCEDURE — 3079F DIAST BP 80-89 MM HG: CPT | Mod: CPTII,S$GLB,, | Performed by: PHYSICAL MEDICINE & REHABILITATION

## 2021-01-29 PROCEDURE — 3072F PR LOW RISK FOR RETINOPATHY: ICD-10-PCS | Mod: S$GLB,,, | Performed by: PHYSICAL MEDICINE & REHABILITATION

## 2021-01-29 PROCEDURE — 1125F AMNT PAIN NOTED PAIN PRSNT: CPT | Mod: S$GLB,,, | Performed by: PHYSICAL MEDICINE & REHABILITATION

## 2021-01-29 PROCEDURE — 3079F PR MOST RECENT DIASTOLIC BLOOD PRESSURE 80-89 MM HG: ICD-10-PCS | Mod: CPTII,S$GLB,, | Performed by: PHYSICAL MEDICINE & REHABILITATION

## 2021-02-11 DIAGNOSIS — M17.0 PRIMARY OSTEOARTHRITIS OF BOTH KNEES: ICD-10-CM

## 2021-02-11 RX ORDER — HYDROCODONE BITARTRATE AND ACETAMINOPHEN 10; 325 MG/1; MG/1
1 TABLET ORAL 3 TIMES DAILY PRN
Qty: 90 TABLET | Refills: 0 | Status: SHIPPED | OUTPATIENT
Start: 2021-02-13 | End: 2021-03-11 | Stop reason: SDUPTHER

## 2021-03-04 ENCOUNTER — TELEPHONE (OUTPATIENT)
Dept: PHYSICAL MEDICINE AND REHAB | Facility: CLINIC | Age: 66
End: 2021-03-04

## 2021-03-11 DIAGNOSIS — M17.0 PRIMARY OSTEOARTHRITIS OF BOTH KNEES: ICD-10-CM

## 2021-03-11 RX ORDER — HYDROCODONE BITARTRATE AND ACETAMINOPHEN 10; 325 MG/1; MG/1
1 TABLET ORAL 3 TIMES DAILY PRN
Qty: 90 TABLET | Refills: 0 | Status: SHIPPED | OUTPATIENT
Start: 2021-03-15 | End: 2021-04-14 | Stop reason: SDUPTHER

## 2021-03-24 ENCOUNTER — TELEPHONE (OUTPATIENT)
Dept: PHYSICAL MEDICINE AND REHAB | Facility: CLINIC | Age: 66
End: 2021-03-24

## 2021-04-05 ENCOUNTER — TELEPHONE (OUTPATIENT)
Dept: OPTOMETRY | Facility: CLINIC | Age: 66
End: 2021-04-05

## 2021-04-14 DIAGNOSIS — M17.0 PRIMARY OSTEOARTHRITIS OF BOTH KNEES: ICD-10-CM

## 2021-04-14 RX ORDER — HYDROCODONE BITARTRATE AND ACETAMINOPHEN 10; 325 MG/1; MG/1
1 TABLET ORAL 3 TIMES DAILY PRN
Qty: 90 TABLET | Refills: 0 | Status: SHIPPED | OUTPATIENT
Start: 2021-04-15 | End: 2021-05-13 | Stop reason: SDUPTHER

## 2021-05-11 ENCOUNTER — HOSPITAL ENCOUNTER (EMERGENCY)
Facility: OTHER | Age: 66
Discharge: HOME | End: 2021-05-11
Attending: EMERGENCY MEDICINE
Payer: MEDICARE

## 2021-05-11 VITALS
SYSTOLIC BLOOD PRESSURE: 155 MMHG | DIASTOLIC BLOOD PRESSURE: 83 MMHG | HEART RATE: 80 BPM | OXYGEN SATURATION: 96 % | RESPIRATION RATE: 17 BRPM | TEMPERATURE: 99 F

## 2021-05-11 DIAGNOSIS — Z79.4 TYPE 2 DIABETES MELLITUS WITH HYPERGLYCEMIA, WITH LONG-TERM CURRENT USE OF INSULIN: ICD-10-CM

## 2021-05-11 DIAGNOSIS — G51.0 LEFT-SIDED BELL'S PALSY: Primary | ICD-10-CM

## 2021-05-11 DIAGNOSIS — I10 HYPERTENSION: ICD-10-CM

## 2021-05-11 DIAGNOSIS — E11.65 TYPE 2 DIABETES MELLITUS WITH HYPERGLYCEMIA, WITH LONG-TERM CURRENT USE OF INSULIN: ICD-10-CM

## 2021-05-11 LAB
ALBUMIN SERPL BCP-MCNC: 3 G/DL (ref 3.5–5.2)
ALP SERPL-CCNC: 61 U/L (ref 55–135)
ALT SERPL W/O P-5'-P-CCNC: 18 U/L (ref 10–44)
ANION GAP SERPL CALC-SCNC: 10 MMOL/L (ref 8–16)
AST SERPL-CCNC: 18 U/L (ref 10–40)
BASOPHILS # BLD AUTO: 0.06 K/UL (ref 0–0.2)
BASOPHILS NFR BLD: 0.6 % (ref 0–1.9)
BILIRUB SERPL-MCNC: 0.5 MG/DL (ref 0.1–1)
BUN SERPL-MCNC: 26 MG/DL (ref 8–23)
CALCIUM SERPL-MCNC: 9.7 MG/DL (ref 8.7–10.5)
CHLORIDE SERPL-SCNC: 103 MMOL/L (ref 95–110)
CO2 SERPL-SCNC: 25 MMOL/L (ref 23–29)
CREAT SERPL-MCNC: 1.4 MG/DL (ref 0.5–1.4)
DIFFERENTIAL METHOD: ABNORMAL
EOSINOPHIL # BLD AUTO: 0.4 K/UL (ref 0–0.5)
EOSINOPHIL NFR BLD: 4.1 % (ref 0–8)
ERYTHROCYTE [DISTWIDTH] IN BLOOD BY AUTOMATED COUNT: 14 % (ref 11.5–14.5)
EST. GFR  (AFRICAN AMERICAN): 45 ML/MIN/1.73 M^2
EST. GFR  (NON AFRICAN AMERICAN): 39 ML/MIN/1.73 M^2
GLUCOSE SERPL-MCNC: 206 MG/DL (ref 70–110)
HCT VFR BLD AUTO: 42.2 % (ref 37–48.5)
HGB BLD-MCNC: 13.3 G/DL (ref 12–16)
IMM GRANULOCYTES # BLD AUTO: 0.03 K/UL (ref 0–0.04)
IMM GRANULOCYTES NFR BLD AUTO: 0.3 % (ref 0–0.5)
LYMPHOCYTES # BLD AUTO: 2.7 K/UL (ref 1–4.8)
LYMPHOCYTES NFR BLD: 27.5 % (ref 18–48)
MCH RBC QN AUTO: 25.6 PG (ref 27–31)
MCHC RBC AUTO-ENTMCNC: 31.5 G/DL (ref 32–36)
MCV RBC AUTO: 81 FL (ref 82–98)
MONOCYTES # BLD AUTO: 0.9 K/UL (ref 0.3–1)
MONOCYTES NFR BLD: 9.2 % (ref 4–15)
NEUTROPHILS # BLD AUTO: 5.8 K/UL (ref 1.8–7.7)
NEUTROPHILS NFR BLD: 58.3 % (ref 38–73)
NRBC BLD-RTO: 0 /100 WBC
PLATELET # BLD AUTO: 211 K/UL (ref 150–450)
PMV BLD AUTO: 10.8 FL (ref 9.2–12.9)
POTASSIUM SERPL-SCNC: 3.9 MMOL/L (ref 3.5–5.1)
PROT SERPL-MCNC: 7.4 G/DL (ref 6–8.4)
RBC # BLD AUTO: 5.19 M/UL (ref 4–5.4)
SODIUM SERPL-SCNC: 138 MMOL/L (ref 136–145)
WBC # BLD AUTO: 9.89 K/UL (ref 3.9–12.7)

## 2021-05-11 PROCEDURE — 36000 PLACE NEEDLE IN VEIN: CPT

## 2021-05-11 PROCEDURE — 93010 EKG 12-LEAD: ICD-10-PCS | Mod: ,,, | Performed by: INTERNAL MEDICINE

## 2021-05-11 PROCEDURE — 80053 COMPREHEN METABOLIC PANEL: CPT | Performed by: EMERGENCY MEDICINE

## 2021-05-11 PROCEDURE — 93010 ELECTROCARDIOGRAM REPORT: CPT | Mod: ,,, | Performed by: INTERNAL MEDICINE

## 2021-05-11 PROCEDURE — 85025 COMPLETE CBC W/AUTO DIFF WBC: CPT | Performed by: EMERGENCY MEDICINE

## 2021-05-11 PROCEDURE — 99284 EMERGENCY DEPT VISIT MOD MDM: CPT | Mod: 25

## 2021-05-11 PROCEDURE — 93005 ELECTROCARDIOGRAM TRACING: CPT

## 2021-05-11 RX ORDER — PREDNISONE 20 MG/1
40 TABLET ORAL DAILY
Qty: 14 TABLET | Refills: 0 | Status: SHIPPED | OUTPATIENT
Start: 2021-05-11 | End: 2021-05-18

## 2021-05-13 DIAGNOSIS — M17.0 PRIMARY OSTEOARTHRITIS OF BOTH KNEES: ICD-10-CM

## 2021-05-14 RX ORDER — HYDROCODONE BITARTRATE AND ACETAMINOPHEN 10; 325 MG/1; MG/1
1 TABLET ORAL 3 TIMES DAILY PRN
Qty: 90 TABLET | Refills: 0 | Status: SHIPPED | OUTPATIENT
Start: 2021-05-15 | End: 2021-06-14 | Stop reason: SDUPTHER

## 2021-05-24 ENCOUNTER — TELEPHONE (OUTPATIENT)
Dept: PHYSICAL MEDICINE AND REHAB | Facility: CLINIC | Age: 66
End: 2021-05-24

## 2021-06-14 DIAGNOSIS — M17.0 PRIMARY OSTEOARTHRITIS OF BOTH KNEES: ICD-10-CM

## 2021-06-14 RX ORDER — HYDROCODONE BITARTRATE AND ACETAMINOPHEN 10; 325 MG/1; MG/1
1 TABLET ORAL 3 TIMES DAILY PRN
Qty: 90 TABLET | Refills: 0 | Status: SHIPPED | OUTPATIENT
Start: 2021-06-14 | End: 2021-07-14 | Stop reason: SDUPTHER

## 2021-07-14 DIAGNOSIS — M17.0 PRIMARY OSTEOARTHRITIS OF BOTH KNEES: ICD-10-CM

## 2021-07-14 RX ORDER — HYDROCODONE BITARTRATE AND ACETAMINOPHEN 10; 325 MG/1; MG/1
1 TABLET ORAL 3 TIMES DAILY PRN
Qty: 90 TABLET | Refills: 0 | Status: SHIPPED | OUTPATIENT
Start: 2021-07-14 | End: 2021-08-13 | Stop reason: SDUPTHER

## 2021-08-13 DIAGNOSIS — M17.0 PRIMARY OSTEOARTHRITIS OF BOTH KNEES: ICD-10-CM

## 2021-08-13 RX ORDER — HYDROCODONE BITARTRATE AND ACETAMINOPHEN 10; 325 MG/1; MG/1
1 TABLET ORAL 3 TIMES DAILY PRN
Qty: 90 TABLET | Refills: 0 | Status: SHIPPED | OUTPATIENT
Start: 2021-08-13 | End: 2021-09-10 | Stop reason: SDUPTHER

## 2021-09-10 DIAGNOSIS — M17.0 PRIMARY OSTEOARTHRITIS OF BOTH KNEES: ICD-10-CM

## 2021-09-10 RX ORDER — HYDROCODONE BITARTRATE AND ACETAMINOPHEN 10; 325 MG/1; MG/1
1 TABLET ORAL 3 TIMES DAILY PRN
Qty: 90 TABLET | Refills: 0 | Status: SHIPPED | OUTPATIENT
Start: 2021-09-12 | End: 2021-10-12 | Stop reason: SDUPTHER

## 2021-10-12 DIAGNOSIS — M17.0 PRIMARY OSTEOARTHRITIS OF BOTH KNEES: ICD-10-CM

## 2021-10-12 RX ORDER — HYDROCODONE BITARTRATE AND ACETAMINOPHEN 10; 325 MG/1; MG/1
1 TABLET ORAL 3 TIMES DAILY PRN
Qty: 90 TABLET | Refills: 0 | Status: SHIPPED | OUTPATIENT
Start: 2021-10-13 | End: 2021-11-12 | Stop reason: SDUPTHER

## 2021-11-12 DIAGNOSIS — M17.0 PRIMARY OSTEOARTHRITIS OF BOTH KNEES: ICD-10-CM

## 2021-11-12 RX ORDER — HYDROCODONE BITARTRATE AND ACETAMINOPHEN 10; 325 MG/1; MG/1
1 TABLET ORAL 3 TIMES DAILY PRN
Qty: 90 TABLET | Refills: 0 | Status: SHIPPED | OUTPATIENT
Start: 2021-11-12 | End: 2021-12-10 | Stop reason: SDUPTHER

## 2021-11-18 ENCOUNTER — TELEPHONE (OUTPATIENT)
Dept: PHYSICAL MEDICINE AND REHAB | Facility: CLINIC | Age: 66
End: 2021-11-18
Payer: MEDICAID

## 2021-11-18 DIAGNOSIS — E11.42 DIABETIC PERIPHERAL NEUROPATHY: ICD-10-CM

## 2021-11-18 DIAGNOSIS — R26.9 GAIT DISORDER: Primary | ICD-10-CM

## 2021-11-18 DIAGNOSIS — M54.50 CHRONIC BILATERAL LOW BACK PAIN WITHOUT SCIATICA: ICD-10-CM

## 2021-11-18 DIAGNOSIS — R53.81 DEBILITY: ICD-10-CM

## 2021-11-18 DIAGNOSIS — G89.29 CHRONIC BILATERAL LOW BACK PAIN WITHOUT SCIATICA: ICD-10-CM

## 2021-11-18 DIAGNOSIS — M54.2 CHRONIC NECK PAIN: ICD-10-CM

## 2021-11-18 DIAGNOSIS — M17.0 PRIMARY OSTEOARTHRITIS OF BOTH KNEES: ICD-10-CM

## 2021-11-18 DIAGNOSIS — G89.29 CHRONIC NECK PAIN: ICD-10-CM

## 2021-11-18 DIAGNOSIS — R29.6 FREQUENT FALLS: ICD-10-CM

## 2021-12-10 DIAGNOSIS — M17.0 PRIMARY OSTEOARTHRITIS OF BOTH KNEES: ICD-10-CM

## 2021-12-10 RX ORDER — HYDROCODONE BITARTRATE AND ACETAMINOPHEN 10; 325 MG/1; MG/1
1 TABLET ORAL 3 TIMES DAILY PRN
Qty: 90 TABLET | Refills: 0 | Status: SHIPPED | OUTPATIENT
Start: 2021-12-13 | End: 2022-01-12 | Stop reason: SDUPTHER

## 2022-01-12 DIAGNOSIS — M17.0 PRIMARY OSTEOARTHRITIS OF BOTH KNEES: ICD-10-CM

## 2022-01-12 RX ORDER — HYDROCODONE BITARTRATE AND ACETAMINOPHEN 10; 325 MG/1; MG/1
1 TABLET ORAL 3 TIMES DAILY PRN
Qty: 90 TABLET | Refills: 0 | Status: SHIPPED | OUTPATIENT
Start: 2022-01-12 | End: 2022-02-10 | Stop reason: SDUPTHER

## 2022-01-12 NOTE — TELEPHONE ENCOUNTER
----- Message from Tiffanie Briones sent at 1/12/2022  8:02 AM CST -----  Regarding: refill  Contact: pt @ [phone]  Rx Refill/Request     Is this a Refill or New Rx:  refill    Rx Name and Strength:  HYDROcodone-acetaminophen (NORCO)  mg per tablet      Preferred Pharmacy with phone number:   [facility] #[id number] - [place], [place] - [address] AT [address] [place] [address]  Phone: [phone] Fax: [phone]      Communication Preference:pt@ [phone]  Additional Information: pt asking to have someone from Dr. Gallo's office please call her.

## 2022-01-18 ENCOUNTER — TELEPHONE (OUTPATIENT)
Dept: NEUROLOGY | Facility: CLINIC | Age: 67
End: 2022-01-18
Payer: MEDICAID

## 2022-01-24 ENCOUNTER — TELEPHONE (OUTPATIENT)
Dept: PHYSICAL MEDICINE AND REHAB | Facility: CLINIC | Age: 67
End: 2022-01-24
Payer: MEDICAID
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
## 2022-01-24 NOTE — TELEPHONE ENCOUNTER
MD Marce Staton MA  Caller: Unspecified (Today,  1:22 PM)  Please ask her to call [facility] and ask him what documents are needed to replace her power wheelchair, preferably fax this to us so we do not have to send any papers unnecessarily.        Called Mrs Yeh twice @ [phone], still no answer.  Patient to contact the  office back.

## 2022-02-10 DIAGNOSIS — M17.0 PRIMARY OSTEOARTHRITIS OF BOTH KNEES: ICD-10-CM

## 2022-02-10 RX ORDER — HYDROCODONE BITARTRATE AND ACETAMINOPHEN 10; 325 MG/1; MG/1
1 TABLET ORAL 3 TIMES DAILY PRN
Qty: 90 TABLET | Refills: 0 | Status: SHIPPED | OUTPATIENT
Start: 2022-02-11 | End: 2022-03-10 | Stop reason: SDUPTHER

## 2022-02-10 NOTE — TELEPHONE ENCOUNTER
----- Message from Tiffanie Briones sent at 2/10/2022  8:02 AM CST -----  Regarding: refill  Contact: pt @ [phone]  Rx Refill/Request     Is this a Refill or New Rx:  refill    Rx Name and Strength:  HYDROcodone-acetaminophen (NORCO)  mg per tablet    Preferred Pharmacy with phone number:     [facility] #[id number] - [place], [place] - [address] AT [address] [place] [address]  Phone: [phone] Fax: [phone]      Communication Preference:pt @ [phone]  Additional Information:

## 2022-02-18 ENCOUNTER — TELEPHONE (OUTPATIENT)
Dept: NEPHROLOGY | Facility: CLINIC | Age: 67
End: 2022-02-18
Payer: MEDICAID

## 2022-02-18 NOTE — TELEPHONE ENCOUNTER
----- Message from Fior Ortiz LPN sent at 2/18/2022  3:19 PM CST -----  Regarding: FW: Nephrology Referral  Dr. Nance pt   ----- Message -----  From: Giselle Jeffrey  Sent: 2/18/2022   3:03 PM CST  To: [facility] Nephro Clinical Staff  Subject: Nephrology Referral                              Good Afternoon,    Monie Dove NP would like to refer the following patient to the Nephrology department. The patients diagnosis is proteinuria. I have scanned the patients referral and records into .     Please let me know if I can help schedule in any way.    Thank you,   Giselle  Chrystal Scott

## 2022-03-10 DIAGNOSIS — M17.0 PRIMARY OSTEOARTHRITIS OF BOTH KNEES: ICD-10-CM

## 2022-03-10 RX ORDER — HYDROCODONE BITARTRATE AND ACETAMINOPHEN 10; 325 MG/1; MG/1
1 TABLET ORAL 3 TIMES DAILY PRN
Qty: 90 TABLET | Refills: 0 | Status: SHIPPED | OUTPATIENT
Start: 2022-03-13 | End: 2022-04-12 | Stop reason: SDUPTHER

## 2022-03-10 NOTE — TELEPHONE ENCOUNTER
----- Message from Dex Joseph sent at 3/10/2022  8:04 AM CST -----  Contact: @ [phone]  Rx Refill/Request     Is this a Refill or New Rx:  yes   Rx Name and Strength:  HYDROcodone-acetaminophen (NORCO)  mg per tablet  Preferred Pharmacy with phone number:     [facility] #[id number] - [place], [place] - [address] AT [address] [place] [address]  Phone: [phone] Fax: [phone]

## 2022-03-23 ENCOUNTER — TELEPHONE (OUTPATIENT)
Dept: PHYSICAL MEDICINE AND REHAB | Facility: CLINIC | Age: 67
End: 2022-03-23
Payer: MEDICAID
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
## 2022-04-12 DIAGNOSIS — M17.0 PRIMARY OSTEOARTHRITIS OF BOTH KNEES: ICD-10-CM

## 2022-04-12 RX ORDER — HYDROCODONE BITARTRATE AND ACETAMINOPHEN 10; 325 MG/1; MG/1
1 TABLET ORAL 3 TIMES DAILY PRN
Qty: 90 TABLET | Refills: 0 | Status: SHIPPED | OUTPATIENT
Start: 2022-04-13 | End: 2022-05-12 | Stop reason: SDUPTHER

## 2022-04-12 NOTE — TELEPHONE ENCOUNTER
Last Rx refill-----03/10/22  Last office visit--01/29/21, pt continue to cancel and no show  scheduled appts.  Next office visit--pt to contact the office for an appointment.

## 2022-04-12 NOTE — TELEPHONE ENCOUNTER
----- Message from Claudia Roberson sent at 4/12/2022 10:08 AM CDT -----  Regarding: refill  Type: RX Refill Request    Who Called:ALEX QUARLES [[id number]]    RX Name and Strength:HYDROcodone-acetaminophen (NORCO)  mg per tablet    [facility] Pharm[facility] with phone number:[id number] - [place], [place] - [address] AT [facility]    Would the patient rather a call back or a response via [facility]?call back    Best Call Back Number:[phone]    Additional Information:

## 2022-04-13 NOTE — TELEPHONE ENCOUNTER
----- Message from Apryl Malloy sent at 4/12/2022  4:46 PM CDT -----  Type:  RX Refill Request    Who Called: Urszula     Refill or New Rx: Refill     RX Name and Strength:HYDROcodone-acetaminophen (NORCO)  mg per tablet    Preferred Pharmacy with phone number:[facility] #[id number] - [place], [place] - [address] AT [facility]    Local or Mail Order:Local     Ordering Provider:Sabrina Gallo MD    Best Call Back [phone]

## 2022-05-02 ENCOUNTER — TELEPHONE (OUTPATIENT)
Dept: OPTOMETRY | Facility: CLINIC | Age: 67
End: 2022-05-02
Payer: MEDICAID

## 2022-05-12 DIAGNOSIS — M17.0 PRIMARY OSTEOARTHRITIS OF BOTH KNEES: ICD-10-CM

## 2022-05-12 RX ORDER — HYDROCODONE BITARTRATE AND ACETAMINOPHEN 10; 325 MG/1; MG/1
1 TABLET ORAL 3 TIMES DAILY PRN
Qty: 90 TABLET | Refills: 0 | Status: SHIPPED | OUTPATIENT
Start: 2022-05-13 | End: 2022-06-10 | Stop reason: SDUPTHER

## 2022-05-12 NOTE — TELEPHONE ENCOUNTER
----- Message from Emma Olivares, Patient Care Assistant sent at 5/12/2022  8:34 AM CDT -----  Regarding: medication refill  Contact: Pt  Pt is calling in regards to medication refill. Pt is also asking that the physician or staff check on the status of her wheelchair.      Medication: HYDROcodone-acetaminophen (NORCO)  mg per tablet    Pharmacy[facility]  [id number] - [place], [place] - [address] AT [facility]

## 2022-05-12 NOTE — TELEPHONE ENCOUNTER
Last Rx refill-----04/12/22  Last office visit--01/29/21  Next office visit--So far there are no available appointments at this time.  The patient was added to the wait list.    Called patient and left a message on her answer machine with [employer] office number to contact for her wheelchair, [phone].

## 2022-05-13 ENCOUNTER — TELEPHONE (OUTPATIENT)
Dept: PHYSICAL MEDICINE AND REHAB | Facility: CLINIC | Age: 67
End: 2022-05-13
Payer: MEDICAID

## 2022-05-13 NOTE — TELEPHONE ENCOUNTER
----- Message from Chito Vieyra sent at 5/13/2022  8:29 AM CDT -----  Contact: @[phone]  Pt requesting a call back regarding a refill for (HYDROcodone-acetaminophen (NORCO)  mg per tablet)  The patient called the pharmacy this morning and was told they do not have the request.  Pt states the pharmacy stated Dr. Gallo has to call the pharmacy.      [facility] #[id number] - [place], [place] - [address] AT [address] [place] [address]  Phone: [phone] Fax: [phone]

## 2022-05-27 ENCOUNTER — TELEPHONE (OUTPATIENT)
Dept: PHYSICAL MEDICINE AND REHAB | Facility: CLINIC | Age: 67
End: 2022-05-27
Payer: MEDICAID

## 2022-05-27 NOTE — TELEPHONE ENCOUNTER
----- Message from Gloria Davila sent at 5/27/2022  8:01 AM CDT -----        Patient calls requesting provider to check on the scooter she was ordered    Patient can be contacted @# [phone]

## 2022-05-27 NOTE — TELEPHONE ENCOUNTER
Called Mrs Yeh with no answer.  I was able to leave [employer] company office number on her answer machine to contact.     Patient can  contact me back  if she has any other questions.

## 2022-06-10 DIAGNOSIS — M17.0 PRIMARY OSTEOARTHRITIS OF BOTH KNEES: ICD-10-CM

## 2022-06-10 RX ORDER — HYDROCODONE BITARTRATE AND ACETAMINOPHEN 10; 325 MG/1; MG/1
1 TABLET ORAL 3 TIMES DAILY PRN
Qty: 90 TABLET | Refills: 0 | Status: SHIPPED | OUTPATIENT
Start: 2022-06-12 | End: 2022-07-12 | Stop reason: SDUPTHER

## 2022-06-10 NOTE — TELEPHONE ENCOUNTER
Last Rx refill-----05/12/22  Last office visit--01/07/22  Next office visit--So far there are no available appointments at this time.  The patient was added to the wait list.

## 2022-06-10 NOTE — TELEPHONE ENCOUNTER
----- Message from Nabila Aguilera sent at 6/10/2022  9:34 AM CDT -----  Regarding: Refill Request  HYDROcodone-acetaminophen (NORCO)  mg per tablet    [id number] - [place], [place] - [address] AT [address] [place] [address]  Phone: [phone] Fax: [phone]

## 2022-06-29 ENCOUNTER — TELEPHONE (OUTPATIENT)
Dept: PHYSICAL MEDICINE AND REHAB | Facility: CLINIC | Age: 67
End: 2022-06-29
Payer: MEDICAID

## 2022-06-29 NOTE — TELEPHONE ENCOUNTER
----- Message from Dex Joseph sent at 6/29/2022  3:27 PM CDT -----  Contact: @ [phone]  Patient calling to get an update on the wheelchair replacement, pls call

## 2022-07-12 DIAGNOSIS — M17.0 PRIMARY OSTEOARTHRITIS OF BOTH KNEES: ICD-10-CM

## 2022-07-12 RX ORDER — HYDROCODONE BITARTRATE AND ACETAMINOPHEN 10; 325 MG/1; MG/1
1 TABLET ORAL 3 TIMES DAILY PRN
Qty: 90 TABLET | Refills: 0 | Status: SHIPPED | OUTPATIENT
Start: 2022-07-12 | End: 2022-08-12 | Stop reason: SDUPTHER

## 2022-07-12 NOTE — TELEPHONE ENCOUNTER
----- Message from Keegan Cartwright MA sent at 7/12/2022  8:36 AM CDT -----  Contact: [phone]  Refill request     HYDROcodone-acetaminophen (NORCO)  mg per tablet        [facility][id number] - [place] [address] AT [facility]   Phone:  [phone]  Fax:  [phone]      Patient is also requesting an script motorized wheelchair.

## 2022-07-12 NOTE — TELEPHONE ENCOUNTER
Last Rx refill-----06/10/22  Last office visit--01/29/21  Next office visit--So far there are no available appointments at this time.  The patient was added to the wait list.

## 2022-08-12 DIAGNOSIS — M17.0 PRIMARY OSTEOARTHRITIS OF BOTH KNEES: ICD-10-CM

## 2022-08-12 RX ORDER — HYDROCODONE BITARTRATE AND ACETAMINOPHEN 10; 325 MG/1; MG/1
1 TABLET ORAL 3 TIMES DAILY PRN
Qty: 90 TABLET | Refills: 0 | Status: SHIPPED | OUTPATIENT
Start: 2022-08-12 | End: 2022-09-12 | Stop reason: SDUPTHER

## 2022-08-12 NOTE — TELEPHONE ENCOUNTER
----- Message from Connie Kelley sent at 8/12/2022  8:09 AM CDT -----  Contact: @ [phone]  Pt is calling to get a refill on the following HYDROcodone-acetaminophen (NORCO)  mg per tablet please call and adv  @ [phone]

## 2022-08-12 NOTE — TELEPHONE ENCOUNTER
Last Rx refill-----07/12/22  Last office visit--01/29/22  Next office visit--So far there are no available appointments at this time.  The patient was added to the wait list.

## 2022-08-12 NOTE — TELEPHONE ENCOUNTER
----- Message from Tiffanie Briones sent at 8/12/2022  4:29 PM CDT -----  Regarding: refill  Contact: pt @ [phone]  Rx Refill/Request     Is this a Refill or New Rx:  refill    Rx Name and Strength:  HYDROcodone-acetaminophen (NORCO)  mg per tablet    Preferred Pharmacy with phone number:     [facility] #[id number] - [place], [place] - [address] AT [address] [place] [address]  Phone: [phone] Fax: [phone]      Communication Preference pt@ [phone]  Additional Information: please call patient, she is wanting to discuss getting a wheelchair.

## 2022-09-12 DIAGNOSIS — M17.0 PRIMARY OSTEOARTHRITIS OF BOTH KNEES: ICD-10-CM

## 2022-09-12 RX ORDER — HYDROCODONE BITARTRATE AND ACETAMINOPHEN 10; 325 MG/1; MG/1
1 TABLET ORAL 3 TIMES DAILY PRN
Qty: 90 TABLET | Refills: 0 | Status: SHIPPED | OUTPATIENT
Start: 2022-09-12 | End: 2022-10-10 | Stop reason: SDUPTHER

## 2022-09-12 NOTE — TELEPHONE ENCOUNTER
----- Message from Adelina Mendoza sent at 2022 10:15 AM CDT -----  Urszula Yeh calling regarding NEW RX (Refills ) (message) for #HYDROcodone-acetaminophen (NORCO)  mg per tablet      [facility][id number] - [place], [place] - [address] AT [address] [place] [address]  Phone: [phone] Fax: [phone]

## 2022-10-10 DIAGNOSIS — M17.0 PRIMARY OSTEOARTHRITIS OF BOTH KNEES: ICD-10-CM

## 2022-10-10 RX ORDER — HYDROCODONE BITARTRATE AND ACETAMINOPHEN 10; 325 MG/1; MG/1
1 TABLET ORAL 3 TIMES DAILY PRN
Qty: 90 TABLET | Refills: 0 | Status: SHIPPED | OUTPATIENT
Start: 2022-10-12 | End: 2022-11-11 | Stop reason: SDUPTHER

## 2022-10-10 NOTE — TELEPHONE ENCOUNTER
----- Message from Marco Agosto sent at 10/10/2022  8:14 AM CDT -----  Regarding: RX refill  Contact: PT @ [phone]  Rx Refill/Request    Is this a Refill or New Rx: Refill    Rx Name and Strength: HYDROcodone-acetaminophen (NORCO)  mg per tablet    Preferred Pharmacy with phone number:     [facility] #[id number] - [place], [place] - [address] AT [address] [place] [address]  Phone: [phone] Fax: [phone]     Communication Preference: PT @ [phone]    Additional Information:

## 2022-11-11 ENCOUNTER — TELEPHONE (OUTPATIENT)
Dept: PHYSICAL MEDICINE AND REHAB | Facility: CLINIC | Age: 67
End: 2022-11-11

## 2022-11-11 DIAGNOSIS — M17.0 PRIMARY OSTEOARTHRITIS OF BOTH KNEES: ICD-10-CM

## 2022-11-11 RX ORDER — HYDROCODONE BITARTRATE AND ACETAMINOPHEN 10; 325 MG/1; MG/1
1 TABLET ORAL 3 TIMES DAILY PRN
Qty: 90 TABLET | Refills: 0 | Status: SHIPPED | OUTPATIENT
Start: 2022-11-12 | End: 2022-12-12 | Stop reason: SDUPTHER

## 2022-11-11 NOTE — TELEPHONE ENCOUNTER
----- Message from Darius Tremayne Brown sent at 11/11/2022 11:32 AM CST -----  Regarding: refills  Contact: pt @ [phone]  Rx Refill/Request    Is this a Refill or New Rx:  refill     Rx Name and Strength:  HYDROcodone-acetaminophen (NORCO)  mg per tablet    Preferred Pharmacy with phone number:     [facility] #[id number] - [place], [place] - [address] AT [address] [place] [address]  Phone: [phone] Fax: [phone]    Communication Preference: pt @ [phone]     Additional Information: Current RX expires today per Epic.

## 2022-12-12 DIAGNOSIS — M17.0 PRIMARY OSTEOARTHRITIS OF BOTH KNEES: ICD-10-CM

## 2022-12-12 RX ORDER — HYDROCODONE BITARTRATE AND ACETAMINOPHEN 10; 325 MG/1; MG/1
1 TABLET ORAL 3 TIMES DAILY PRN
Qty: 90 TABLET | Refills: 0 | Status: SHIPPED | OUTPATIENT
Start: 2022-12-12 | End: 2023-01-10 | Stop reason: SDUPTHER

## 2022-12-12 NOTE — TELEPHONE ENCOUNTER
----- Message from Marco Agosto sent at 12/12/2022  1:20 PM CST -----  Regarding: RX issue  Contact: PT @ [phone]  Pt is calling to speak to someone in the office to discuss rx:HYDROcodone-acetaminophen (NORCO)  mg per tablet. Pt states that she did call the pharmacy and has been advised that the rx is not there. Pt is concerned b/c she states that she called in early to make sure rx would be at the pharmacy, so she does not run out. Please call pt. Thanks.      [facility] #[id number] - [place], [place] - [address] AT [address] [place] [address]  Phone: [phone] Fax: [phone]

## 2022-12-12 NOTE — TELEPHONE ENCOUNTER
----- Message from Tiffanie Briones sent at 12/12/2022  9:49 AM CST -----  Regarding: refill  Contact: pt @ [phone]  Rx Refill/Request     Is this a Refill or New Rx:  refill    Rx Name and Strength:  HYDROcodone-acetaminophen (NORCO)  mg per tablet    Preferred Pharmacy with phone number:     [facility] #[id number] - [place], [place] - [address] AT [address] [place] [address]  Phone: [phone] Fax: [phone]      Communication Preference:pt @ [phone]  Additional Information:

## 2023-01-10 DIAGNOSIS — M17.0 PRIMARY OSTEOARTHRITIS OF BOTH KNEES: ICD-10-CM

## 2023-01-10 RX ORDER — HYDROCODONE BITARTRATE AND ACETAMINOPHEN 10; 325 MG/1; MG/1
1 TABLET ORAL 3 TIMES DAILY PRN
Qty: 90 TABLET | Refills: 0 | Status: SHIPPED | OUTPATIENT
Start: 2023-01-13 | End: 2023-02-10 | Stop reason: SDUPTHER

## 2023-01-10 NOTE — TELEPHONE ENCOUNTER
----- Message from Neo Greenfield sent at 1/10/2023 11:40 AM CST -----  Regarding: Refill  Contact: pt [phone]  Rx Refill/Request    Is this a Refill or New Rx:  refill    Rx Name and Strength:  HYDROcodone-acetaminophen (NORCO)  mg per tablet 90 tablet     Preferred Pharmacy with phone number:    [facility] #[id number] - [place], [place] - [address] AT [address] [place] [address]  Phone: [phone] Fax: [phone]     Communication Preference:  Additional Information:

## 2023-02-10 DIAGNOSIS — M17.0 PRIMARY OSTEOARTHRITIS OF BOTH KNEES: ICD-10-CM

## 2023-02-10 RX ORDER — HYDROCODONE BITARTRATE AND ACETAMINOPHEN 10; 325 MG/1; MG/1
1 TABLET ORAL 3 TIMES DAILY PRN
Qty: 90 TABLET | Refills: 0 | Status: SHIPPED | OUTPATIENT
Start: 2023-02-12 | End: 2023-02-13 | Stop reason: SDUPTHER

## 2023-02-10 NOTE — TELEPHONE ENCOUNTER
----- Message from Keegan Cartwright MA sent at 2/10/2023  9:05 AM CST -----  Regarding: Med refill  Contact: [phone]  Patient is requesting refill on     HYDROcodone-acetaminophen (NORCO)  mg per tablet        [id number] - [place] - [address] AT [address] [place] [address]  Phone: [phone] Fax: [phone]

## 2023-02-13 DIAGNOSIS — M17.0 PRIMARY OSTEOARTHRITIS OF BOTH KNEES: ICD-10-CM

## 2023-02-13 RX ORDER — HYDROCODONE BITARTRATE AND ACETAMINOPHEN 10; 325 MG/1; MG/1
1 TABLET ORAL 3 TIMES DAILY PRN
Qty: 90 TABLET | Refills: 0 | Status: SHIPPED | OUTPATIENT
Start: 2023-02-13 | End: 2023-03-13 | Stop reason: SDUPTHER

## 2023-02-13 RX ORDER — HYDROCODONE BITARTRATE AND ACETAMINOPHEN 10; 325 MG/1; MG/1
1 TABLET ORAL 3 TIMES DAILY PRN
Qty: 90 TABLET | Refills: 0 | OUTPATIENT
Start: 2023-02-13 | End: 2023-03-15

## 2023-02-13 NOTE — TELEPHONE ENCOUNTER
Last ordered: 02/10/23    ----- Message from Scooby Olivares sent at 2/13/2023  4:19 PM [facility] -----  Contact: [phone]  Refill   HYDROcodone-acetaminophen (NORCO)  mg per tablet      [facility] #[id number] - [place], [place] - [address] AT [address] [place] [address]  Phone: [phone] Fax: [phone]     Pts call back number [phone]

## 2023-02-13 NOTE — TELEPHONE ENCOUNTER
----- Message from Teena Tavarez CMA sent at 2/13/2023  8:22 AM CST -----  Regarding: Medication transmission failed to send medication  Contact: [phone]  Pt states pain medication was not at the pharmacy, I was able to see the transmission did not go through properly.    HYDROcodone-acetaminophen (NORCO)  mg per tablet       [facility][id number] - [place] - [address] AT [address] [place] [address]  Phone: [phone] Fax: [phone]      Thank you

## 2023-03-13 DIAGNOSIS — M17.0 PRIMARY OSTEOARTHRITIS OF BOTH KNEES: ICD-10-CM

## 2023-03-13 RX ORDER — HYDROCODONE BITARTRATE AND ACETAMINOPHEN 10; 325 MG/1; MG/1
1 TABLET ORAL 3 TIMES DAILY PRN
Qty: 90 TABLET | Refills: 0 | Status: SHIPPED | OUTPATIENT
Start: 2023-03-15 | End: 2023-04-14 | Stop reason: SDUPTHER

## 2023-03-13 NOTE — TELEPHONE ENCOUNTER
----- Message from Gloria Davila sent at 3/13/2023  8:44 AM CDT -----  Regarding: med refill  The patient called requesting  refill of HYDROcodone-acetaminophen (NORCO)  mg per tablet  Please send to pharmacy on file    No further information provided      Patient can be contacted @# [phone] (home)

## 2023-03-13 NOTE — TELEPHONE ENCOUNTER
----- Message from Giselle Mcqueen sent at 3/13/2023  4:07 PM CDT -----  Regarding: Refill  Contact: Pt [phone]  Patient called to request refill of HYDROcodone-acetaminophen (NORCO)  mg per tablet

## 2023-04-14 ENCOUNTER — NURSE TRIAGE (OUTPATIENT)
Dept: ADMINISTRATIVE | Facility: CLINIC | Age: 68
End: 2023-04-14
Payer: MEDICARE

## 2023-04-14 DIAGNOSIS — M17.0 PRIMARY OSTEOARTHRITIS OF BOTH KNEES: ICD-10-CM

## 2023-04-14 NOTE — TELEPHONE ENCOUNTER
[place]    PCP[place]  [facility]  [place]    She states needs a refill for Norco  mg sent to [facility].  Per protocol, care advised is discuss with PCP and callback by Nurse today.  Advised to call for worsening/questions/concerns.  VU.     Reason for Disposition   Caller requesting a CONTROLLED substance prescription refill (e.g., narcotics, ADHD medicines)    Additional Information   Negative: Prescription refill request for ESSENTIAL medicine (i.e., likelihood of harm to patient if not taken) and triager unable to refill per department policy   Negative: Prescription not at pharmacy and was prescribed by PCP recently  (Exception: triager has access to EMR and prescription is recorded there. Go to Home Care and confirm for pharmacy.)   Negative: Pharmacy calling with prescription questions and triager unable to answer question    Protocols used: Medication Refill and Renewal Call-A-OH

## 2023-04-14 NOTE — TELEPHONE ENCOUNTER
----- Message from Christiane Rangel sent at 4/14/2023  1:03 PM CDT -----  Type:  RX Refill Request    Who Called: Pt     Refill or New Rx:Refill     RX Name and Strength:HYDROcodone-acetaminophen (NORCO)  mg per tablet    How is the patient currently taking it? Sig - Route: Take 1 tablet by mouth 3 (three) times daily as needed for Pain. - Oral  Sent to pharmacy as: HYDROcodone-acetaminophen (NORCO)  mg per tablet  Class: Normal        Is this a 30 day or 90 day RX:    Preferred Pharmacy with phone number:[facility] #[id number] - [place], [place] - [address] [facility]    Local or Mail Order:Local     Ordering Provider:Sabrina Gallo MD    Would the patient rather a call back or a response via [facility]?  Call back     Best Call Back Number:[phone] (mobile)     Additional Information:

## 2023-04-15 RX ORDER — HYDROCODONE BITARTRATE AND ACETAMINOPHEN 10; 325 MG/1; MG/1
1 TABLET ORAL 3 TIMES DAILY PRN
Qty: 90 TABLET | Refills: 0 | Status: SHIPPED | OUTPATIENT
Start: 2023-04-15 | End: 2023-05-12 | Stop reason: SDUPTHER

## 2023-05-12 DIAGNOSIS — M17.0 PRIMARY OSTEOARTHRITIS OF BOTH KNEES: ICD-10-CM

## 2023-05-12 RX ORDER — HYDROCODONE BITARTRATE AND ACETAMINOPHEN 10; 325 MG/1; MG/1
1 TABLET ORAL 3 TIMES DAILY PRN
Qty: 90 TABLET | Refills: 0 | Status: SHIPPED | OUTPATIENT
Start: 2023-05-15 | End: 2023-05-16 | Stop reason: SDUPTHER

## 2023-05-12 NOTE — TELEPHONE ENCOUNTER
Last ordered:04/15/23    Last seen:01/29/21        ----- Message from Sherrie Kay sent at 5/12/2023  9:21 AM CDT -----  Regarding: Refills  Contact: @[phone]  Pt is requesting HYDROcodone-acetaminophen (NORCO)  mg per tablet needs refills sent to      [facility] #[id number] - [place], [place] - [address] AT [address] [place] [address]  Phone: [phone] Fax: [phone]    @[phone]

## 2023-05-16 DIAGNOSIS — M17.0 PRIMARY OSTEOARTHRITIS OF BOTH KNEES: ICD-10-CM

## 2023-05-16 RX ORDER — HYDROCODONE BITARTRATE AND ACETAMINOPHEN 10; 325 MG/1; MG/1
1 TABLET ORAL 3 TIMES DAILY PRN
Qty: 90 TABLET | Refills: 0 | Status: SHIPPED | OUTPATIENT
Start: 2023-05-16 | End: 2023-06-14 | Stop reason: SDUPTHER

## 2023-05-16 NOTE — TELEPHONE ENCOUNTER
----- Message from Divay Max MA sent at 5/16/2023 11:07 AM CDT -----  Contact: [phone]    Type:  RX Refill Request      Who Called: Patient      RX Name and Strength:HYDROcodone-acetaminophen (NORCO) 10mg (pt states it's 10 mg.      Preferred Pharmacy with phone number:     Pt states she wants this medication sent to [facility] on [address] [facility] in [place] because the other location did not have the medication.        Would the patient rather a call back or a response via [facility]?  callback      Best Call Back Number: [phone]      Additional Information:

## 2023-05-16 NOTE — TELEPHONE ENCOUNTER
Last ordered: 05/12/23    Last seen:01/29/21    ----- Message from Jose De La Garza sent at 5/16/2023  3:53 PM CDT -----  Regarding: PHARMACY OUT  Contact: Self  Pt stated the [facility] on [address] is out of the      HYDROcodone-acetaminophen (NORCO)  mg per tablet Need to send the prescription to the     [facility] on [address]   Phone     Pt stated she is completely out of her medication. Pt stated she is in pain right now. Pt's pain level: 10 Pt ask for a call back as soon as possible today please      Contact info  [phone] [place]

## 2023-06-14 DIAGNOSIS — M17.0 PRIMARY OSTEOARTHRITIS OF BOTH KNEES: ICD-10-CM

## 2023-06-14 NOTE — TELEPHONE ENCOUNTER
----- Message from Eileen Ceballos sent at 6/14/2023  8:31 AM CDT -----  Regarding: refill  Contact: [phone]  Pt calling in refill for  Hydrocodone. [facility] #[id number] [place] [address] AT [facility]   Phone:  [phone]  Fax:  [phone]

## 2023-06-15 RX ORDER — HYDROCODONE BITARTRATE AND ACETAMINOPHEN 10; 325 MG/1; MG/1
1 TABLET ORAL 3 TIMES DAILY PRN
Qty: 90 TABLET | Refills: 0 | Status: SHIPPED | OUTPATIENT
Start: 2023-06-15 | End: 2023-07-14 | Stop reason: SDUPTHER

## 2023-06-15 NOTE — TELEPHONE ENCOUNTER
----- Message from Connie Kelley sent at 6/15/2023  9:00 AM CDT -----  Regarding: refill  Contact: @ [phone]  Pt is calling to get a refill on the following HYDROcodone-acetaminophen (NORCO)  mg per tablet  ...Please call and adv @ [phone]

## 2023-07-14 DIAGNOSIS — M17.0 PRIMARY OSTEOARTHRITIS OF BOTH KNEES: ICD-10-CM

## 2023-07-14 NOTE — TELEPHONE ENCOUNTER
Last ordered:06/15/23    Last seen:01/29/21    ----- Message from Chito Vieyra sent at 7/14/2023  9:25 AM CDT -----  Regarding: Refill  Contact: [phone]  Pt requesting a refill for medication HYDROcodone-acetaminophen (NORCO)  mg per tablet.  Please call to discuss further.      [facility] #[id number] - [place], [place] - [address] AT [address] [place] [address]  Phone: [phone] Fax: [phone]

## 2023-07-15 RX ORDER — HYDROCODONE BITARTRATE AND ACETAMINOPHEN 10; 325 MG/1; MG/1
1 TABLET ORAL 3 TIMES DAILY PRN
Qty: 90 TABLET | Refills: 0 | Status: SHIPPED | OUTPATIENT
Start: 2023-07-15 | End: 2023-08-15 | Stop reason: SDUPTHER

## 2023-08-15 DIAGNOSIS — M17.0 PRIMARY OSTEOARTHRITIS OF BOTH KNEES: ICD-10-CM

## 2023-08-15 RX ORDER — HYDROCODONE BITARTRATE AND ACETAMINOPHEN 10; 325 MG/1; MG/1
1 TABLET ORAL 3 TIMES DAILY PRN
Qty: 90 TABLET | Refills: 0 | Status: SHIPPED | OUTPATIENT
Start: 2023-08-15 | End: 2023-09-14 | Stop reason: SDUPTHER

## 2023-09-14 DIAGNOSIS — M17.0 PRIMARY OSTEOARTHRITIS OF BOTH KNEES: ICD-10-CM

## 2023-09-14 RX ORDER — HYDROCODONE BITARTRATE AND ACETAMINOPHEN 10; 325 MG/1; MG/1
1 TABLET ORAL 3 TIMES DAILY PRN
Qty: 90 TABLET | Refills: 0 | Status: SHIPPED | OUTPATIENT
Start: 2023-09-15 | End: 2023-09-18 | Stop reason: SDUPTHER

## 2023-09-14 NOTE — TELEPHONE ENCOUNTER
----- Message from Salud Arreola sent at 9/14/2023  4:01 PM CDT -----  Regarding: refill  Contact: [phone]  Pt requesting refill of medication listed. Pls call to discuss.   HYDROcodone-acetaminophen (NORCO)  mg per tablet    ..  [facility] #[id number] - [place] - [address] AT [address] [place] [address]  Phone: [phone] Fax: [phone]

## 2023-09-14 NOTE — TELEPHONE ENCOUNTER
----- Message from Sade Jackson sent at 9/14/2023  8:46 AM CDT -----  Regarding: rx refill  Contact: pt @[phone]               Is this a Refill or New Rx: refill         Rx Name and Strength:HYDROcodone-acetaminophen (NORCO)  mg per tablet         Preferred Pharmacy with phone number:    [facility] #[id number] - [place] - [address] AT [address] [place] [address]  Phone: [phone] Fax: [phone]         Communication Preference:pt @[phone]         Additional Information: n/a

## 2023-09-15 ENCOUNTER — TELEPHONE (OUTPATIENT)
Dept: NEUROLOGY | Facility: CLINIC | Age: 68
End: 2023-09-15
Payer: MEDICARE

## 2023-09-15 NOTE — TELEPHONE ENCOUNTER
----- Message from Ashanti Lopez sent at 9/15/2023  3:04 PM CDT -----  Regarding: Refill  Contact: [phone]  Type:  RX Refill Request        Who Called: Urszula Yeh        Refill or New Rx: Refill        RX Name and Strength:HYDROcodone-acetaminophen (NORCO)  mg per tablet        Preferred Pharmacy with phone number:  [facility] on [address]        Local or Mail Order: Local        Would the patient rather a call back or a response via [facility]?        Best Call Back Number:         Additional Information: [facility] is out of stock

## 2023-09-15 NOTE — TELEPHONE ENCOUNTER
----- Message from Connie Kelley sent at 9/15/2023  9:03 AM CDT -----  Regarding: change location  Contact: @ [phone]  Pt called in regards to changing location for her HYDROcodone-acetaminophen (NORCO)  mg per tablet .....Please call and adv @ [phone]      [facility] [address] [phone]

## 2023-09-18 DIAGNOSIS — M17.0 PRIMARY OSTEOARTHRITIS OF BOTH KNEES: ICD-10-CM

## 2023-09-18 RX ORDER — HYDROCODONE BITARTRATE AND ACETAMINOPHEN 10; 325 MG/1; MG/1
1 TABLET ORAL 3 TIMES DAILY PRN
Qty: 90 TABLET | Refills: 0 | Status: SHIPPED | OUTPATIENT
Start: 2023-09-18 | End: 2023-09-19 | Stop reason: SDUPTHER

## 2023-09-18 NOTE — TELEPHONE ENCOUNTER
Patient states current pharmacy doesn't have the medication. Would like it to go to this pharmacy.

## 2023-09-19 DIAGNOSIS — M17.0 PRIMARY OSTEOARTHRITIS OF BOTH KNEES: ICD-10-CM

## 2023-09-19 RX ORDER — HYDROCODONE BITARTRATE AND ACETAMINOPHEN 10; 325 MG/1; MG/1
1 TABLET ORAL 3 TIMES DAILY PRN
Qty: 90 TABLET | Refills: 0 | Status: SHIPPED | OUTPATIENT
Start: 2023-09-19 | End: 2023-09-21 | Stop reason: SDUPTHER

## 2023-09-20 DIAGNOSIS — M17.0 PRIMARY OSTEOARTHRITIS OF BOTH KNEES: ICD-10-CM

## 2023-09-20 RX ORDER — HYDROCODONE BITARTRATE AND ACETAMINOPHEN 10; 325 MG/1; MG/1
1 TABLET ORAL 3 TIMES DAILY PRN
Qty: 90 TABLET | Refills: 0 | OUTPATIENT
Start: 2023-09-20 | End: 2023-10-20

## 2023-09-21 ENCOUNTER — TELEPHONE (OUTPATIENT)
Dept: PHYSICAL MEDICINE AND REHAB | Facility: CLINIC | Age: 68
End: 2023-09-21
Payer: MEDICARE

## 2023-09-21 DIAGNOSIS — M17.0 PRIMARY OSTEOARTHRITIS OF BOTH KNEES: ICD-10-CM

## 2023-09-21 RX ORDER — HYDROCODONE BITARTRATE AND ACETAMINOPHEN 10; 325 MG/1; MG/1
1 TABLET ORAL 3 TIMES DAILY PRN
Qty: 90 TABLET | Refills: 0 | Status: SHIPPED | OUTPATIENT
Start: 2023-09-21 | End: 2023-10-19 | Stop reason: SDUPTHER

## 2023-09-21 NOTE — TELEPHONE ENCOUNTER
----- Message from Jaylen Garrett sent at 9/21/2023  2:18 PM CDT -----  Regarding: Medication Questions  Contact: @ [phone]  Pt is calling in wanting to know if the provider sent her prescription HYDROcodone-acetaminophen (NORCO)  mg per tablet to her pharmacy. Please call pt to discuss further           [facility] [address]  [address]  [place] [address]  Phone: [phone] Fax: [phone]

## 2023-09-21 NOTE — TELEPHONE ENCOUNTER
----- Message from Sabrina Gallo MD sent at 9/21/2023  1:46 PM CDT -----  Regarding: RE: HYDROcodone-acetaminophen (NORCO)  mg per tablet  Please let her know I resent her prescription for hydrocodone/APAP to [facility] pharmacy.  ----- Message -----  From: Carol Milligan MA  Sent: 9/21/2023   1:38 PM CDT  To: Sabrina Gallo MD  Subject: FW: HYDROcodone-acetaminophen (NORCO) #    Can you resent rx to pharmacy below   [facility] [place] [address]   Phone: [phone] Fax: [phone]    ----- Message -----  From: Connie Nath  Sent: 9/21/2023   1:21 PM CDT  To: [facility] Staff  Subject: HYDROcodone-acetaminophen (NORCO)  mg #    Refill request.   Pt calling to have Rx sent to this Pharmacy . Closes at 6pm , please call once called in       HYDROcodone-acetaminophen (NORCO)  mg per tablet      [facility] [place] [address]  Phone: [phone] Fax: [phone]      Confirmed patient's contact info below:  Contact Name: Urszula Yeh  Phone Number: [phone]

## 2023-10-19 ENCOUNTER — TELEPHONE (OUTPATIENT)
Dept: NEUROLOGY | Facility: CLINIC | Age: 68
End: 2023-10-19
Payer: MEDICARE

## 2023-10-19 DIAGNOSIS — M17.0 PRIMARY OSTEOARTHRITIS OF BOTH KNEES: ICD-10-CM

## 2023-10-19 RX ORDER — HYDROCODONE BITARTRATE AND ACETAMINOPHEN 10; 325 MG/1; MG/1
1 TABLET ORAL 3 TIMES DAILY PRN
Qty: 90 TABLET | Refills: 0 | Status: SHIPPED | OUTPATIENT
Start: 2023-10-21 | End: 2023-10-24 | Stop reason: SDUPTHER

## 2023-10-19 NOTE — TELEPHONE ENCOUNTER
----- Message from Breezy DAO Route sent at 10/19/2023  2:14 PM CDT -----  Regarding: Refill  Contact: pt. [phone]  Rx Refill/Request Is this a Refill or New Rx:  Refill    Rx Name and Strength:  HYDROcodone-acetaminophen (NORCO)  mg per tablet    Preferred Pharmacy with phone number:     [facility] [address]  [address]  [place] [address]  Phone: [phone] Fax: [phone]    Communication Preference:[phone]    Additional Information:  Pharmacy closes at 6pm

## 2023-10-24 DIAGNOSIS — M17.0 PRIMARY OSTEOARTHRITIS OF BOTH KNEES: ICD-10-CM

## 2023-10-24 RX ORDER — HYDROCODONE BITARTRATE AND ACETAMINOPHEN 10; 325 MG/1; MG/1
1 TABLET ORAL 3 TIMES DAILY PRN
Qty: 90 TABLET | Refills: 0 | Status: SHIPPED | OUTPATIENT
Start: 2023-10-24 | End: 2023-11-21 | Stop reason: SDUPTHER

## 2023-10-24 RX ORDER — HYDROCODONE BITARTRATE AND ACETAMINOPHEN 10; 325 MG/1; MG/1
1 TABLET ORAL 3 TIMES DAILY PRN
Qty: 90 TABLET | Refills: 0 | Status: CANCELLED | OUTPATIENT
Start: 2023-10-24 | End: 2023-11-23

## 2023-10-24 NOTE — TELEPHONE ENCOUNTER
----- Message from Adelina Mendoza sent at 10/24/2023 11:25 AM CDT -----  Regarding: status of refill  Contact: [phone]  ALEX QUARLES calling regarding Refills  (message) for #HYDROcodone-acetaminophen (NORCO)  mg per tablet      [facility]

## 2023-11-21 DIAGNOSIS — M17.0 PRIMARY OSTEOARTHRITIS OF BOTH KNEES: ICD-10-CM

## 2023-11-21 RX ORDER — HYDROCODONE BITARTRATE AND ACETAMINOPHEN 10; 325 MG/1; MG/1
1 TABLET ORAL 3 TIMES DAILY PRN
Qty: 90 TABLET | Refills: 0 | Status: SHIPPED | OUTPATIENT
Start: 2023-11-24 | End: 2023-11-24 | Stop reason: SDUPTHER

## 2023-11-24 ENCOUNTER — TELEPHONE (OUTPATIENT)
Dept: NEUROLOGY | Facility: CLINIC | Age: 68
End: 2023-11-24
Payer: MEDICARE

## 2023-11-24 DIAGNOSIS — M17.0 PRIMARY OSTEOARTHRITIS OF BOTH KNEES: ICD-10-CM

## 2023-11-24 RX ORDER — HYDROCODONE BITARTRATE AND ACETAMINOPHEN 10; 325 MG/1; MG/1
1 TABLET ORAL 3 TIMES DAILY PRN
Qty: 90 TABLET | Refills: 0 | Status: SHIPPED | OUTPATIENT
Start: 2023-11-24 | End: 2023-12-22 | Stop reason: SDUPTHER

## 2023-11-24 RX ORDER — HYDROCODONE BITARTRATE AND ACETAMINOPHEN 10; 325 MG/1; MG/1
1 TABLET ORAL 3 TIMES DAILY PRN
Qty: 90 TABLET | Refills: 0 | Status: CANCELLED | OUTPATIENT
Start: 2023-11-24 | End: 2023-12-24

## 2023-11-24 NOTE — TELEPHONE ENCOUNTER
----- Message from Breezy DAO Route sent at 11/24/2023  9:01 AM CST -----  Regarding: Wrong Pharmacy  Contact: [phone]  Rx Refill/Request Is this a Refill or New Rx:  Refill    Rx Name and Strength:  HYDROcodone-acetaminophen (NORCO)  mg per tablet     Preferred Pharmacy with phone number:     [facility] - [place] - [address]  [address]  [place] [address]  Phone: [phone] Fax: [phone]      Communication Preference: [phone]    Additional Information: Medication was sent to wrong pharmacy

## 2023-11-25 NOTE — TELEPHONE ENCOUNTER
----- Message from Sherrie Kay sent at 11/24/2023  3:19 PM CST -----  Regarding: URGENT patient needs medication to be changed to [facility]  Contact: [phone]  Regarding:URGENT patient needs medication to be changed to [facility]    Please -  make sure the icd 10 is on the rx,thank you for:     HYDROcodone-acetaminophen (NORCO)  mg per tablet        Contact: [facility]    Type: Call back to advise    Who Called: Urszula    Would the patient rather a call back or a response via [facility]? Phone call    Best Call Back Number: Phone: [phone];     Additional Information:   [facility] [id number] [facility] [place] [address]   Phone: [phone]; Fax: [phone]    Closes at 6pm today

## 2023-12-22 DIAGNOSIS — M17.0 PRIMARY OSTEOARTHRITIS OF BOTH KNEES: ICD-10-CM

## 2023-12-22 RX ORDER — HYDROCODONE BITARTRATE AND ACETAMINOPHEN 10; 325 MG/1; MG/1
1 TABLET ORAL 3 TIMES DAILY PRN
Qty: 90 TABLET | Refills: 0 | Status: SHIPPED | OUTPATIENT
Start: 2023-12-23 | End: 2024-01-23 | Stop reason: SDUPTHER

## 2024-01-23 DIAGNOSIS — M17.0 PRIMARY OSTEOARTHRITIS OF BOTH KNEES: ICD-10-CM

## 2024-01-23 RX ORDER — HYDROCODONE BITARTRATE AND ACETAMINOPHEN 10; 325 MG/1; MG/1
1 TABLET ORAL 3 TIMES DAILY PRN
Qty: 90 TABLET | Refills: 0 | Status: SHIPPED | OUTPATIENT
Start: 2024-01-25 | End: 2024-02-23 | Stop reason: SDUPTHER

## 2024-02-23 DIAGNOSIS — M17.0 PRIMARY OSTEOARTHRITIS OF BOTH KNEES: ICD-10-CM

## 2024-02-23 RX ORDER — HYDROCODONE BITARTRATE AND ACETAMINOPHEN 10; 325 MG/1; MG/1
1 TABLET ORAL 3 TIMES DAILY PRN
Qty: 90 TABLET | Refills: 0 | Status: SHIPPED | OUTPATIENT
Start: 2024-02-24 | End: 2024-03-22 | Stop reason: SDUPTHER

## 2024-02-23 NOTE — TELEPHONE ENCOUNTER
----- Message from Sade Jackson sent at 2/23/2024 10:15 AM CST -----  Regarding: Refill Request  Contact: Pt @[phone]               Is this a Refill or New Rx: refill         Rx Name and Strength:HYDROcodone-acetaminophen (NORCO)  mg per tablet         Preferred Pharmacy with phone number:    [facility] - [place] - [address] [facility] [address]  [address] [facility] [address]  [place] [address]  Phone: [phone] Fax: [phone]

## 2024-03-22 DIAGNOSIS — M17.0 PRIMARY OSTEOARTHRITIS OF BOTH KNEES: ICD-10-CM

## 2024-03-22 RX ORDER — HYDROCODONE BITARTRATE AND ACETAMINOPHEN 10; 325 MG/1; MG/1
1 TABLET ORAL 3 TIMES DAILY PRN
Qty: 90 TABLET | Refills: 0 | Status: SHIPPED | OUTPATIENT
Start: 2024-03-25 | End: 2024-04-24 | Stop reason: SDUPTHER

## 2024-03-22 NOTE — TELEPHONE ENCOUNTER
----- Message from Eileen Ceballos sent at 3/22/2024  8:21 AM CDT -----  Regarding: pt advice  Contact: [phone]  Pt calling in refill for HYDROcodone-acetaminophen (NORCO)  mg per tablet. Pls call         ..  [facility] - [place] - [address] [place] [address] [address]  [address] [place] [address]  [place] [address]  Phone: [phone] Fax: [phone]

## 2024-04-24 DIAGNOSIS — M17.0 PRIMARY OSTEOARTHRITIS OF BOTH KNEES: ICD-10-CM

## 2024-04-24 NOTE — TELEPHONE ENCOUNTER
----- Message from Salud Arreola sent at 4/24/2024 10:53 AM CDT -----  Regarding: refill  Contact: [phone]  Pt requesting refill of medication listed. Pls call to discuss.   HYDROcodone-acetaminophen (NORCO)  mg per tablet    ..  [facility] - [place] - [address] [facility] [address]  [address] [facility] [address]  [place] [address]  Phone: [phone] Fax: [phone]

## 2024-04-25 RX ORDER — HYDROCODONE BITARTRATE AND ACETAMINOPHEN 10; 325 MG/1; MG/1
1 TABLET ORAL 3 TIMES DAILY PRN
Qty: 90 TABLET | Refills: 0 | Status: SHIPPED | OUTPATIENT
Start: 2024-04-25 | End: 2024-05-24 | Stop reason: SDUPTHER

## 2024-05-24 DIAGNOSIS — M17.0 PRIMARY OSTEOARTHRITIS OF BOTH KNEES: ICD-10-CM

## 2024-05-24 RX ORDER — HYDROCODONE BITARTRATE AND ACETAMINOPHEN 10; 325 MG/1; MG/1
1 TABLET ORAL 3 TIMES DAILY PRN
Qty: 90 TABLET | Refills: 0 | Status: SHIPPED | OUTPATIENT
Start: 2024-05-25 | End: 2024-06-24

## 2024-05-24 NOTE — TELEPHONE ENCOUNTER
----- Message from Cyrus Pulido sent at 5/24/2024  8:25 AM CDT -----  Urszula Yeh calling regarding Refills  (message) for #HYDROcodone-acetaminophen (NORCO)  mg per tablet    [facility] - [place] - [address] [facility] [address]  [address] [facility] [address]  [place] [address]  Phone: [phone] Fax: [phone]

## 2024-06-24 DIAGNOSIS — M17.0 PRIMARY OSTEOARTHRITIS OF BOTH KNEES: ICD-10-CM

## 2024-06-24 RX ORDER — HYDROCODONE BITARTRATE AND ACETAMINOPHEN 10; 325 MG/1; MG/1
1 TABLET ORAL 3 TIMES DAILY PRN
Qty: 90 TABLET | Refills: 0 | Status: SHIPPED | OUTPATIENT
Start: 2024-06-25 | End: 2024-07-25

## 2024-06-24 NOTE — TELEPHONE ENCOUNTER
----- Message from Salud Arreola sent at 6/24/2024  8:14 AM CDT -----  Regarding: refill  Contact: [phone]  Pt requesting refill of medication listed. Pls call to discuss.   HYDROcodone-acetaminophen (NORCO)  mg per tablet    ..  [facility] - [place] - [facility] [facility] [address]  [facility] [address]  [place] [address]  Phone: [phone] Fax: [phone]

## 2024-07-24 DIAGNOSIS — M17.0 PRIMARY OSTEOARTHRITIS OF BOTH KNEES: ICD-10-CM

## 2024-07-24 RX ORDER — HYDROCODONE BITARTRATE AND ACETAMINOPHEN 10; 325 MG/1; MG/1
1 TABLET ORAL 3 TIMES DAILY PRN
Qty: 90 TABLET | Refills: 0 | Status: SHIPPED | OUTPATIENT
Start: 2024-07-25 | End: 2024-08-24

## 2024-08-23 ENCOUNTER — TELEPHONE (OUTPATIENT)
Dept: PHYSICAL MEDICINE AND REHAB | Facility: CLINIC | Age: 69
End: 2024-08-23
Payer: MEDICAID

## 2024-08-23 NOTE — TELEPHONE ENCOUNTER
----- Message from Keerthi De La O sent at 8/23/2024 11:46 AM CDT -----  Regarding: Refill  Contact: [phone]  Is this a Refill or New Rx (Script/Out of Refills):  Refill     Name of Caller: Patient     Rx Name: HYDROcodone-acetaminophen (NORCO)  mg per tablet    [facility]:   [facility] [place] - [facility] [facility] [address]  [facility] [address]  [place] [address]  Phone: [phone] Fax: [phone]

## 2024-08-28 ENCOUNTER — TELEPHONE (OUTPATIENT)
Dept: PHYSICAL MEDICINE AND REHAB | Facility: CLINIC | Age: 69
End: 2024-08-28
Payer: MEDICAID

## 2024-08-28 DIAGNOSIS — M17.0 PRIMARY OSTEOARTHRITIS OF BOTH KNEES: ICD-10-CM

## 2024-08-28 RX ORDER — HYDROCODONE BITARTRATE AND ACETAMINOPHEN 10; 325 MG/1; MG/1
1 TABLET ORAL 3 TIMES DAILY PRN
Qty: 90 TABLET | Refills: 0 | Status: SHIPPED | OUTPATIENT
Start: 2024-08-28 | End: 2024-09-27

## 2024-08-28 RX ORDER — HYDROCODONE BITARTRATE AND ACETAMINOPHEN 10; 325 MG/1; MG/1
1 TABLET ORAL 3 TIMES DAILY PRN
Qty: 90 TABLET | Refills: 0 | Status: CANCELLED | OUTPATIENT
Start: 2024-08-28 | End: 2024-09-27

## 2024-08-28 NOTE — TELEPHONE ENCOUNTER
----- Message from Keerthi De La O sent at 8/27/2024  9:09 AM CDT -----  Regarding: Refill  Contact: [phone]  Is this a Refill or New Rx (Script/Out of Refills): Refill     Name of Caller: Patient     Rx Name: HYDROcodone-acetaminophen (NORCO)  mg per tablet    Pharmacy Name:   [facility] - [place] - [facility] [facility] [address]  [facility] [address]  [place] [address]  Phone: [phone] Fax: [phone]    Additional Information: Please notify pt once medication is sent over.

## 2024-08-28 NOTE — TELEPHONE ENCOUNTER
----- Message from Delmi Dozier sent at 8/28/2024 12:29 PM CDT -----  Contact: pt @ [phone]  Rx Refill/Request    Is this a Refill or New Rx: refill     Rx Name and Strength:  HYDROcodone-acetaminophen (NORCO)  mg per tablet    [facility] with phone number:[facility] [place] - [address] [facility] [address]  [address] [facility] [address] [place] [address]  Phone: [phone] Fax: [phone]      Communication Preference:Asking for call back     Additional Information

## 2024-09-27 DIAGNOSIS — M17.0 PRIMARY OSTEOARTHRITIS OF BOTH KNEES: ICD-10-CM

## 2024-09-27 RX ORDER — HYDROCODONE BITARTRATE AND ACETAMINOPHEN 10; 325 MG/1; MG/1
1 TABLET ORAL 3 TIMES DAILY PRN
Qty: 90 TABLET | Refills: 0 | Status: SHIPPED | OUTPATIENT
Start: 2024-09-27 | End: 2024-10-27

## 2024-09-27 NOTE — TELEPHONE ENCOUNTER
----- Message from Karolina Prieto sent at 9/27/2024  8:12 AM CDT -----  Regarding: rx refill  Contact: pt @ [phone]  Rx Refill/Request Is this a Refill or New Rx: refill     Rx Name and Strength: HYDROcodone-acetaminophen (NORCO)  mg per tablet    Preferred Pharmacy with phone number:   [facility] - [place] - [facility] [facility] [address]  [facility] [facility] [address]  [place] [address]  Phone: [phone] Fax: [phone]      Communication Preference: call back     Additional Information:  Pt is needing a refill of rx HYDROcodone-acetaminophen (NORCO)  mg per tablet. Please call to advise further. Thank you for all you are doing.

## 2024-10-28 DIAGNOSIS — M17.0 PRIMARY OSTEOARTHRITIS OF BOTH KNEES: ICD-10-CM

## 2024-10-28 RX ORDER — HYDROCODONE BITARTRATE AND ACETAMINOPHEN 10; 325 MG/1; MG/1
1 TABLET ORAL 3 TIMES DAILY PRN
Qty: 90 TABLET | Refills: 0 | Status: SHIPPED | OUTPATIENT
Start: 2024-10-28 | End: 2024-11-27

## 2024-11-25 DIAGNOSIS — M17.0 PRIMARY OSTEOARTHRITIS OF BOTH KNEES: ICD-10-CM

## 2024-11-25 RX ORDER — HYDROCODONE BITARTRATE AND ACETAMINOPHEN 10; 325 MG/1; MG/1
1 TABLET ORAL 3 TIMES DAILY PRN
Qty: 90 TABLET | Refills: 0 | Status: SHIPPED | OUTPATIENT
Start: 2024-11-26 | End: 2024-12-26

## 2024-11-25 RX ORDER — HYDROCODONE BITARTRATE AND ACETAMINOPHEN 10; 325 MG/1; MG/1
1 TABLET ORAL 3 TIMES DAILY PRN
Qty: 90 TABLET | Refills: 0 | Status: CANCELLED | OUTPATIENT
Start: 2024-11-25 | End: 2024-12-25

## 2024-11-25 NOTE — TELEPHONE ENCOUNTER
----- Message from Salud sent at 11/25/2024  8:46 AM CST -----  Regarding: refill  Contact: [phone]  ..Pt requesting refill of medication listed. Pls call if needing to discuss.   HYDROcodone-acetaminophen (NORCO)  mg per tablet    ..  [facility] - [place] - [address] [facility] [address]  [address] [facility] [address]  [place] [address]  Phone: [phone] Fax: [phone]

## 2024-12-03 ENCOUNTER — OFFICE VISIT (OUTPATIENT)
Dept: PODIATRY | Facility: CLINIC | Age: 69
End: 2024-12-03
Payer: MEDICARE

## 2024-12-03 VITALS
BODY MASS INDEX: 24.17 KG/M2 | DIASTOLIC BLOOD PRESSURE: 85 MMHG | HEART RATE: 84 BPM | HEIGHT: 67 IN | SYSTOLIC BLOOD PRESSURE: 149 MMHG

## 2024-12-03 DIAGNOSIS — B35.1 ONYCHOMYCOSIS DUE TO DERMATOPHYTE: ICD-10-CM

## 2024-12-03 DIAGNOSIS — E11.51 DIABETES MELLITUS WITH PERIPHERAL VASCULAR DISEASE: Primary | ICD-10-CM

## 2024-12-03 PROCEDURE — 99999 PR PBB SHADOW E&M-EST. PATIENT-LVL IV: CPT | Mod: PBBFAC,,, | Performed by: PODIATRIST

## 2024-12-03 RX ORDER — NAPROXEN SODIUM 220 MG/1
1 TABLET, FILM COATED ORAL DAILY
COMMUNITY
Start: 2024-07-25

## 2024-12-03 RX ORDER — DULAGLUTIDE 1.5 MG/.5ML
1.5 INJECTION, SOLUTION SUBCUTANEOUS
COMMUNITY
Start: 2024-09-05

## 2024-12-03 RX ORDER — LIDOCAINE 50 MG/G
PATCH TOPICAL
COMMUNITY
Start: 2024-09-19

## 2024-12-03 RX ORDER — FUROSEMIDE 40 MG/1
1 TABLET ORAL 2 TIMES DAILY
COMMUNITY
Start: 2024-07-25

## 2024-12-03 RX ORDER — DICLOFENAC SODIUM 10 MG/G
4 GEL TOPICAL 4 TIMES DAILY PRN
COMMUNITY
Start: 2024-07-25

## 2024-12-03 RX ORDER — ALBUTEROL SULFATE 0.83 MG/ML
2.5 SOLUTION RESPIRATORY (INHALATION)
COMMUNITY
Start: 2024-07-25

## 2024-12-03 RX ORDER — OXYCODONE AND ACETAMINOPHEN 5; 325 MG/1; MG/1
TABLET ORAL
COMMUNITY
Start: 2024-07-03

## 2024-12-03 RX ORDER — TIOTROPIUM BROMIDE 18 UG/1
1 CAPSULE ORAL; RESPIRATORY (INHALATION)
COMMUNITY
Start: 2024-09-05

## 2024-12-03 RX ORDER — TRAZODONE HYDROCHLORIDE 50 MG/1
1 TABLET ORAL NIGHTLY PRN
COMMUNITY
Start: 2024-09-05

## 2024-12-03 RX ORDER — DEXTROMETHORPHAN HYDROBROMIDE, GUAIFENESIN 5; 100 MG/5ML; MG/5ML
650 LIQUID ORAL EVERY 8 HOURS PRN
COMMUNITY
Start: 2024-07-25

## 2024-12-03 RX ORDER — METOPROLOL SUCCINATE 25 MG/1
25 TABLET, EXTENDED RELEASE ORAL
COMMUNITY
Start: 2024-11-18

## 2024-12-03 RX ORDER — LOSARTAN POTASSIUM 50 MG/1
TABLET ORAL
COMMUNITY
Start: 2024-10-12

## 2024-12-03 RX ORDER — LOPERAMIDE HYDROCHLORIDE 2 MG/1
CAPSULE ORAL 4 TIMES DAILY PRN
COMMUNITY
Start: 2024-09-05

## 2024-12-03 RX ORDER — ONDANSETRON 4 MG/1
TABLET, FILM COATED ORAL
COMMUNITY
Start: 2024-07-03

## 2024-12-03 RX ORDER — FERROUS SULFATE 325(65) MG
325 TABLET, DELAYED RELEASE (ENTERIC COATED) ORAL
COMMUNITY

## 2024-12-03 NOTE — PROGRESS NOTES
Subjective:      Patient ID: Urszula Yeh is a 69 y.o. female.    Chief Complaint: Diabetic Foot Exam (7/25/24 - George Cervantes MD)    Urszula is a 69 y.o. female who presents to the clinic for evaluation and treatment of high risk feet. Urszula has a past medical history of Cataract, Gait disorder (9/12/2012), HTN (hypertension) (2009), Right foot pain (9/12/2012), and Type II or unspecified type diabetes mellitus without mention of complication, uncontrolled (2009). The patient's chief complaint is long, thick toenails. This patient has documented high risk feet requiring routine maintenance secondary to peripheral vascular disease.    [facility]P: [facility]    Date Last Seen by PCP:   Chief Complaint   Patient presents with    Diabetic Foot Exam     7/25/24 - George Cervantes MD         Current shoe gear:  Affected Foot: Slip-on shoes     Unaffected Foot: Slip-on shoes    Hemoglobin A1C   Date Value Ref Range Status   09/21/2023 5.6 4.7 - 5.6 % Final   06/15/2023 5.5 4.7 - 5.6 % Final   12/25/2022 6.2 (H) 4.0 - 6.0 % Final   12/03/2013 9.0 (H) 4.5 - 6.2 % Final       Review of Systems   Constitutional: Negative for chills, fever and malaise/fatigue.   HENT:  Negative for hearing loss.    Cardiovascular:  Negative for claudication.   Respiratory:  Negative for shortness of breath.    Skin:  Positive for color change, dry skin, nail changes and unusual hair distribution. Negative for flushing and rash.   Musculoskeletal:  Negative for joint pain and myalgias.   Gastrointestinal:  Negative for nausea and vomiting.   Neurological:  Negative for loss of balance, numbness, paresthesias and sensory change.   Psychiatric/Behavioral:  Negative for altered mental status.    Allergic/Immunologic: Negative for hives.           Objective:      Physical Exam  Vitals reviewed.   Constitutional:       Appearance: She is well-developed.   Cardiovascular:      Pulses:           Dorsalis pedis  pulses are 0 on the right side and 0 on the left side.        Posterior tibial pulses are 0 on the right side and 0 on the left side.   Musculoskeletal:      Right ankle: Decreased range of motion. Abnormal pulse.      Right Achilles Tendon: Guerrier's test negative.      Left ankle: Decreased range of motion. Abnormal pulse.      Left Achilles Tendon: Guerrier's test negative.      Right foot: Decreased range of motion.      Left foot: Decreased range of motion.   Feet:      Right foot:      Protective Sensation: 5 sites tested.  2 sites sensed.      Left foot:      Protective Sensation: 5 sites tested.  2 sites sensed.   Skin:     General: Skin is cool and dry.      Capillary Refill: Capillary refill takes more than 3 seconds.      Coloration: Skin is ashen.      Comments:  No open lesions noted.   Skin is thin, terse, shiny, and cool, b/L.     Neurological:      Mental Status: She is alert.      Gait: Gait abnormal.      Comments: diminished sensation noted to b/L lower extremities   Psychiatric:         Behavior: Behavior normal. Behavior is cooperative.         Nails x10 are elongated by  5-8mm's, thickened by 3-4 mm's, dystrophic, and are yellowish in  coloration . Xerosis Bilaterally. No open lesions noted.             Assessment:       Encounter Diagnoses   Name Primary?    Diabetes mellitus with peripheral vascular disease Yes    Onychomycosis due to dermatophyte          Plan:       Urszula was seen today for diabetic foot exam.    Diagnoses and all orders for this visit:    Diabetes mellitus with peripheral vascular disease  -     DIABETIC SHOES FOR HOME USE    Onychomycosis due to dermatophyte      I counseled the patient on her conditions, their implications and medical management.    Rx diabetic shoes    -Discussed DM foot care:  Wear comfortable, proper fitting shoes. Wash feet daily. Dry well. After drying, apply moisturizer to feet (no lotion to webspaces). Inspect feet daily for skin breaks,  blisters, swelling, or redness. Wear cotton socks (preferably white)  Change socks every day. Do NOT walk barefoot. Do NOT use heating pads or warm/hot water soaks      - Discussed importance of daily moisturizer to the feet such as Gold bonds diabetic foot cream     - Patient is intermediate  risk for developing lower extremity issues secondary to diabetes     - Advised the patient that fungus likes warm, dark, and moist environments such as bathrooms, gyms, and pools. Advised to spray shower, shower mat , and any shoes w/ Lysol periodically      - With patient's permission, nails were aggressively reduced and debrided x 10 to their soft tissue attachment mechanically and with electric , removing all offending nail and debris. Patient relates relief following the procedure. She will continue to monitor the areas daily, inspect her feet, wear protective shoe gear when ambulatory, moisturizer to maintain skin integrity and follow in this office in approximately 2-3 months, sooner p.r.n.

## 2024-12-05 ENCOUNTER — TELEPHONE (OUTPATIENT)
Dept: PHYSICAL MEDICINE AND REHAB | Facility: CLINIC | Age: 69
End: 2024-12-05
Payer: MEDICARE

## 2024-12-05 NOTE — TELEPHONE ENCOUNTER
----- Message from Mariam sent at 12/4/2024  1:08 PM CST -----  Regarding: Pt called states she wld like to speak with someone to see if dr received package she left for him on 12/03  Contact: [phone]  Name of Who is Calling:ALEX QUARLES [[id number]]        What is the request in detail:Pt called states she wld like to speak with someone to see if dr received package she left for him on 12/03. Please advise         Can the clinic reply by [facility]:No        What Number to Call Back if not in [facility]: Telephone Information:  Mobile          [phone]

## 2024-12-20 DIAGNOSIS — M17.0 PRIMARY OSTEOARTHRITIS OF BOTH KNEES: ICD-10-CM

## 2024-12-20 RX ORDER — HYDROCODONE BITARTRATE AND ACETAMINOPHEN 10; 325 MG/1; MG/1
1 TABLET ORAL 3 TIMES DAILY PRN
Qty: 90 TABLET | Refills: 0 | Status: SHIPPED | OUTPATIENT
Start: 2024-12-27 | End: 2025-01-26

## 2024-12-20 NOTE — TELEPHONE ENCOUNTER
----- Message from Johanna sent at 12/20/2024  8:25 AM CST -----  Regarding: Rx Refill  Contact: [phone]  ALEX QUARLES calling regarding Refills  (message) for #  HYDROcodone-acetaminophen (NORCO)  mg per tablet      [facility] - [place] - [address] [facility] [address]  [address] [facility] [address]  [place] [address]  Phone: [phone] Fax: [phone]        Pt is asking if she can get her meds filled before holiday pls advise

## 2025-01-24 DIAGNOSIS — M17.0 PRIMARY OSTEOARTHRITIS OF BOTH KNEES: ICD-10-CM

## 2025-01-24 RX ORDER — HYDROCODONE BITARTRATE AND ACETAMINOPHEN 10; 325 MG/1; MG/1
1 TABLET ORAL 3 TIMES DAILY PRN
Qty: 90 TABLET | Refills: 0 | Status: SHIPPED | OUTPATIENT
Start: 2025-01-25 | End: 2025-02-24

## 2025-01-24 NOTE — TELEPHONE ENCOUNTER
----- Message from Johanna sent at 1/24/2025  9:04 AM CST -----  Regarding: Rx Refill  Urszula Yeh calling regarding Refills  (message) for #  HYDROcodone-acetaminophen (NORCO)  mg per tablet        [facility] - [place] - [address] [facility] [address]  [address] [facility] [address]  [place] [address]  Phone: [phone] Fax: [phone]

## 2025-01-27 ENCOUNTER — TELEPHONE (OUTPATIENT)
Dept: PHYSICAL MEDICINE AND REHAB | Facility: CLINIC | Age: 70
End: 2025-01-27
Payer: MEDICARE

## 2025-01-27 NOTE — TELEPHONE ENCOUNTER
----- Message from Connie sent at 1/27/2025  9:18 AM CST -----  Refill request.      HYDROcodone-acetaminophen (NORCO)  mg per tablet      [facility] - [place] - [address] [facility] [address]  [address] [place] [address]  [place] [address]  Phone: [phone] Fax: [phone]    Confirmed patient's contact info below:  Contact Name: Urszula Yeh  Phone Number: [phone]

## 2025-02-25 DIAGNOSIS — M17.0 PRIMARY OSTEOARTHRITIS OF BOTH KNEES: ICD-10-CM

## 2025-02-25 RX ORDER — HYDROCODONE BITARTRATE AND ACETAMINOPHEN 10; 325 MG/1; MG/1
1 TABLET ORAL 3 TIMES DAILY PRN
Qty: 90 TABLET | Refills: 0 | Status: SHIPPED | OUTPATIENT
Start: 2025-02-26 | End: 2025-03-28

## 2025-03-06 ENCOUNTER — TELEPHONE (OUTPATIENT)
Dept: PODIATRY | Facility: CLINIC | Age: 70
End: 2025-03-06
Payer: MEDICARE

## 2025-03-06 NOTE — TELEPHONE ENCOUNTER
Spoke with pt in regards to missed 3/6 appt. Pt requests appt be sent to the house so daughter can get it. Pt r/s and verbalized understanding. Appt reminder mailed.

## 2025-03-24 DIAGNOSIS — M17.0 PRIMARY OSTEOARTHRITIS OF BOTH KNEES: ICD-10-CM

## 2025-03-24 RX ORDER — HYDROCODONE BITARTRATE AND ACETAMINOPHEN 10; 325 MG/1; MG/1
1 TABLET ORAL 3 TIMES DAILY PRN
Qty: 90 TABLET | Refills: 0 | Status: SHIPPED | OUTPATIENT
Start: 2025-03-28 | End: 2025-03-25 | Stop reason: SDUPTHER

## 2025-03-25 DIAGNOSIS — M17.0 PRIMARY OSTEOARTHRITIS OF BOTH KNEES: ICD-10-CM

## 2025-03-25 RX ORDER — HYDROCODONE BITARTRATE AND ACETAMINOPHEN 10; 325 MG/1; MG/1
1 TABLET ORAL 3 TIMES DAILY PRN
Qty: 90 TABLET | Refills: 0 | Status: SHIPPED | OUTPATIENT
Start: 2025-03-28 | End: 2025-04-27

## 2025-04-25 DIAGNOSIS — M17.0 PRIMARY OSTEOARTHRITIS OF BOTH KNEES: ICD-10-CM

## 2025-04-25 RX ORDER — HYDROCODONE BITARTRATE AND ACETAMINOPHEN 10; 325 MG/1; MG/1
1 TABLET ORAL 3 TIMES DAILY PRN
Qty: 90 TABLET | Refills: 0 | Status: SHIPPED | OUTPATIENT
Start: 2025-04-26 | End: 2025-05-26

## 2025-04-25 NOTE — TELEPHONE ENCOUNTER
----- Message from Mariam sent at 4/25/2025  8:03 AM CDT -----  Regarding: Pt called states need refills on the Below Rx  Can the clinic reply in [facility]:  Please refill the medication(s) listed below. Please call the patient when the prescription(s) is ready for  at this phone number     Medication #1 HYDROcodone-acetaminophen (NORCO)  mg per tabletMedication #2  Preferred Pharmacy: [facility] - [place]  [address] [facility] [address] [place], [place] [address] [address]Gpsyy: [phone] Fax: [phone]

## 2025-05-01 ENCOUNTER — TELEPHONE (OUTPATIENT)
Dept: PODIATRY | Facility: CLINIC | Age: 70
End: 2025-05-01
Payer: MEDICARE

## 2025-05-26 ENCOUNTER — TELEPHONE (OUTPATIENT)
Dept: PHYSICAL MEDICINE AND REHAB | Facility: CLINIC | Age: 70
End: 2025-05-26
Payer: MEDICARE

## 2025-05-26 DIAGNOSIS — M17.0 PRIMARY OSTEOARTHRITIS OF BOTH KNEES: ICD-10-CM

## 2025-05-26 RX ORDER — HYDROCODONE BITARTRATE AND ACETAMINOPHEN 10; 325 MG/1; MG/1
1 TABLET ORAL 3 TIMES DAILY PRN
Qty: 90 TABLET | Refills: 0 | Status: CANCELLED | OUTPATIENT
Start: 2025-05-26 | End: 2025-06-25

## 2025-05-26 NOTE — TELEPHONE ENCOUNTER
Source   Urszula Yeh (Patient)    Subject   Urszula Yeh (Patient)    Topic   Medications - Medication Refill      Summary   Medication refill request   Communication   Type:  RX Refill Request            Who Called: Urszula Dewittill or New Rx: refill      RX Name and Strength: HYDROcodone-acetaminophen (NORCO)  mg per tablet      How is the patient currently taking it? (ex. 1XDay): Take 1 tablet by mouth 3 (three) times daily as needed for Pain. - Oral      Is this a 30 day or 90 day RX:  pt states 90 day      Preferred Pharmacy with phone number: [facility] [phone]      Local or Mail Order: local      Ordering Provider: Sabrina Gallo      Would the patient rather a call back or a response via [facility]? Call back      Best Call Back Number: [phone]      Additional Information:  patient would like to see if Dr Gallo will prescribe her some Percocet

## 2025-05-27 ENCOUNTER — TELEPHONE (OUTPATIENT)
Dept: PHYSICAL MEDICINE AND REHAB | Facility: CLINIC | Age: 70
End: 2025-05-27
Payer: MEDICARE

## 2025-05-27 DIAGNOSIS — M17.0 PRIMARY OSTEOARTHRITIS OF BOTH KNEES: ICD-10-CM

## 2025-05-27 RX ORDER — HYDROCODONE BITARTRATE AND ACETAMINOPHEN 10; 325 MG/1; MG/1
1 TABLET ORAL 3 TIMES DAILY PRN
Qty: 90 TABLET | Refills: 0 | Status: SHIPPED | OUTPATIENT
Start: 2025-05-27

## 2025-05-27 NOTE — TELEPHONE ENCOUNTER
----- Message from Geraldinelizziejorje sent at 5/27/2025  9:36 AM CDT -----  Regarding: Refill  Contact: [phone]  Type:  RX Refill RequestWho Called: PatientRefill or New Rx: HYDROcodone-acetaminophen (NORCO)  mg per tabletRX Name and Strength: HYDROcodone-acetaminophen (NORCO)  mg per tabletPreferred Pharmacy with phone number:[facility] - [place], [place] - [address] [facility] [address] [place], [place] [address]Phone: [phone] Fax: [phone] or Mail Order: LocalOrdering Provider: Dr. Maddox the patient rather a call back or a response via [facility]? callbackBest Call Back Number: [phone] Information: Patient stated she called yesterday about the medication

## 2025-05-27 NOTE — TELEPHONE ENCOUNTER
Returned call to patient to let her know that her prescription was called into her preferred pharmacy. Pt did not answer and voicemail not set up.

## 2025-06-27 DIAGNOSIS — M17.0 PRIMARY OSTEOARTHRITIS OF BOTH KNEES: ICD-10-CM

## 2025-06-27 RX ORDER — HYDROCODONE BITARTRATE AND ACETAMINOPHEN 10; 325 MG/1; MG/1
1 TABLET ORAL 3 TIMES DAILY PRN
Qty: 90 TABLET | Refills: 0 | Status: SHIPPED | OUTPATIENT
Start: 2025-06-27

## 2025-06-27 NOTE — TELEPHONE ENCOUNTER
Copied from CRM #[id number]. Topic: Medications - Medication Refill  >> Jun 27, 2025  8:39 AM Ivan wrote:  Type:  RX Refill Request    Who Called:  HYDROcodone-acetaminophen (NORCO)  mg per tablet    Refill or New Rx: HYDROcodone-acetaminophen (NORCO)  mg per tablet    [facility] Pharm[facility] with phone number[facility]  [facility] [place] - [address] [facility] [address]  [address] [facility] [address]  [place] [address]  Phone: [phone] Fax: [phone]      Local or Mail Order: Local  Ordering Provider: Dr. Gallo  Would the patient rather a call back or a response via [facility]? callback  Best Call Back Number: [phone]    Additional Information: n/a

## 2025-07-10 ENCOUNTER — TELEPHONE (OUTPATIENT)
Dept: PHYSICAL MEDICINE AND REHAB | Facility: CLINIC | Age: 70
End: 2025-07-10
Payer: MEDICARE

## 2025-07-10 NOTE — TELEPHONE ENCOUNTER
Reached out to patient to inform that the appt on 7/22/25 has been canceled due to provider being out of office/no answer and her voicemail box was not set up.

## 2025-07-25 DIAGNOSIS — M17.0 PRIMARY OSTEOARTHRITIS OF BOTH KNEES: ICD-10-CM

## 2025-07-25 RX ORDER — HYDROCODONE BITARTRATE AND ACETAMINOPHEN 10; 325 MG/1; MG/1
1 TABLET ORAL 3 TIMES DAILY PRN
Qty: 90 TABLET | Refills: 0 | Status: SHIPPED | OUTPATIENT
Start: 2025-07-28

## 2025-07-25 NOTE — TELEPHONE ENCOUNTER
Copied from CRM #[id number]. Topic: Medications - Medication Refill  >> Jul 25, 2025 10:38 AM Johanna wrote:  HYDROcodone-acetaminophen (NORCO)  mg per tablet      [facility] - [place] - [address] [facility] [address]  [address] [facility] [address]  [place] [address]  Phone: [phone] Fax: [phone]

## 2025-08-26 DIAGNOSIS — M17.0 PRIMARY OSTEOARTHRITIS OF BOTH KNEES: ICD-10-CM

## 2025-08-26 RX ORDER — HYDROCODONE BITARTRATE AND ACETAMINOPHEN 10; 325 MG/1; MG/1
1 TABLET ORAL 3 TIMES DAILY PRN
Qty: 90 TABLET | Refills: 0 | Status: SHIPPED | OUTPATIENT
Start: 2025-08-27